# Patient Record
Sex: FEMALE | Race: WHITE | Employment: FULL TIME | URBAN - METROPOLITAN AREA
[De-identification: names, ages, dates, MRNs, and addresses within clinical notes are randomized per-mention and may not be internally consistent; named-entity substitution may affect disease eponyms.]

---

## 2017-01-23 PROBLEM — E89.41 SURGICAL MENOPAUSE, SYMPTOMATIC: Status: ACTIVE | Noted: 2017-01-23

## 2017-03-20 DIAGNOSIS — E89.41 SURGICAL MENOPAUSE, SYMPTOMATIC: ICD-10-CM

## 2017-03-20 RX ORDER — ESTRADIOL 0.03 MG/D
1 PATCH TRANSDERMAL WEEKLY
Qty: 4 PATCH | Refills: 0 | Status: CANCELLED | OUTPATIENT
Start: 2017-03-20

## 2017-04-05 ENCOUNTER — TELEPHONE (OUTPATIENT)
Dept: FAMILY MEDICINE | Facility: CLINIC | Age: 52
End: 2017-04-05

## 2017-04-05 NOTE — TELEPHONE ENCOUNTER
Panel Management Review      Patient has the fo    Composite cancer screening  Chart review shows that this patient is due/due soon for the following Mammogram  Summary:    Patient is due/failing the following:   MAMMOGRAM    Action needed:   Patient needs referral/order: mammogram    Type of outreach:    Sent letter.    Questions for provider review:    None                                                                                                                                    DAIN VALLEJO      .

## 2017-04-05 NOTE — LETTER
[facility]  [address] [address]  [phone]        April 5, 2017    Elaina Owens  [address] [place] [address]          Dear Elaina,    --Mammogram screening is generally recommended every year starting at age of 50.  Some women may choose to be screened at an earlier age ( between 40 & 50) depending on risk factors and discussions with her primary provider.   You can call [phone] to schedule a mammogram.    If you have had this test at an outside facility, please let us know so that we can update your record.     Please disregard this notice if you have already scheduled an appointment.     Sincerely,    [facility] - [phone]  [email or web address]

## 2017-08-24 ENCOUNTER — TELEPHONE (OUTPATIENT)
Dept: OBGYN | Facility: CLINIC | Age: 52
End: 2017-08-24

## 2017-08-24 NOTE — TELEPHONE ENCOUNTER
Panel Management Review          Composite cancer screening  Chart review shows that this patient is due/due soon for the following Mammogram  Summary:    Patient is due/failing the following:   MAMMOGRAM    Action needed:   Patient needs office visit for Mammogram.    Type of outreach:    Sent [employer] message.    Questions for provider review:    None                                                                                                                                    Susana Spears CMA

## 2017-08-24 NOTE — LETTER
August 24, 2017      Ealina Owens  [address] [place] [address]    Dear ,      This letter is to remind you that you are due for your follow up Mammogram.    Please call [phone] to schedule your appointment at your earliest convenience.     If you have completed the tests outside of [facility], please have the results forwarded to our office. We will update the chart for your primary Physician to review before your next annual physical.     Sincerely,      Yessenia Lake MD

## 2017-11-20 ENCOUNTER — TELEPHONE (OUTPATIENT)
Dept: FAMILY MEDICINE | Facility: CLINIC | Age: 52
End: 2017-11-20

## 2018-07-25 ENCOUNTER — OFFICE VISIT (OUTPATIENT)
Dept: FAMILY MEDICINE | Facility: CLINIC | Age: 53
End: 2018-07-25
Payer: COMMERCIAL

## 2018-07-25 VITALS
RESPIRATION RATE: 16 BRPM | TEMPERATURE: 98.5 F | SYSTOLIC BLOOD PRESSURE: 108 MMHG | BODY MASS INDEX: 29.18 KG/M2 | HEIGHT: 66 IN | WEIGHT: 181.6 LBS | OXYGEN SATURATION: 90 % | HEART RATE: 100 BPM | DIASTOLIC BLOOD PRESSURE: 84 MMHG

## 2018-07-25 DIAGNOSIS — J20.9 ACUTE BRONCHITIS, UNSPECIFIED ORGANISM: Primary | ICD-10-CM

## 2018-07-25 PROCEDURE — 99213 OFFICE O/P EST LOW 20 MIN: CPT | Performed by: NURSE PRACTITIONER

## 2018-07-25 RX ORDER — ALBUTEROL SULFATE 90 UG/1
2 AEROSOL, METERED RESPIRATORY (INHALATION) EVERY 4 HOURS PRN
Qty: 1 INHALER | Refills: 0 | Status: SHIPPED | OUTPATIENT
Start: 2018-07-25 | End: 2019-08-05

## 2018-07-25 RX ORDER — PREDNISONE 20 MG/1
40 TABLET ORAL DAILY
Qty: 10 TABLET | Refills: 0 | Status: SHIPPED | OUTPATIENT
Start: 2018-07-25 | End: 2018-07-30

## 2018-07-25 RX ORDER — AZITHROMYCIN 250 MG/1
TABLET, FILM COATED ORAL
Qty: 6 TABLET | Refills: 0 | Status: SHIPPED | OUTPATIENT
Start: 2018-07-25 | End: 2019-08-05

## 2018-07-25 NOTE — PATIENT INSTRUCTIONS

## 2018-07-25 NOTE — LETTER
[facility]  [address] [address]  Phone: [phone]    07/25/18    Elaina Owens  [address] [place] [address]      To whom it may concern:     Elaina was seen today in clinic for an acute illness. Please excuse her missed work. She may return to work when she has been fever free for 24 hours. She has been instructed to avoid work while she is having a fever.    Sincerely,      YAKOV Cast CNP

## 2018-07-25 NOTE — MR AVS SNAPSHOT
After Visit Summary   7/25/2018    Elaina Owens    MRN: [id number]           Patient Information     Date Of Birth          1965        Visit Information        Provider Department      7/25/2018 9:40 AM Nina Mckeon APRN [facility]        Today's Diagnoses     Acute bronchitis, unspecified organism    -  1      Care Instructions      Bronchitis, Antibiotic Treatment (Adult)    Bronchitis is an infection of the air passages (bronchial tubes) in your lungs. It often occurs when you have a cold. This illness is contagious during the first few days and is spread through the air by coughing and sneezing, or by direct contact (touching the sick person and then touching your own eyes, nose, or mouth).  Symptoms of bronchitis include cough with mucus (phlegm) and low-grade fever. Bronchitis usually lasts 7 to 14 days. Mild cases can be treated with simple home remedies. More severe infection is treated with an antibiotic.  Home care  Follow these guidelines when caring for yourself at home:    If your symptoms are severe, rest at home for the first 2 to 3 days. When you go back to your usual activities, don't let yourself get too tired.    Do not smoke. Also avoid being exposed to secondhand smoke.    You may use over-the-counter medicines to control fever or pain, unless another medicine was prescribed. If you have chronic liver or kidney disease or have ever had a stomach ulcer or gastrointestinal bleeding, talk with your healthcare provider before using these medicines. Also talk to your provider if you are taking medicine to prevent blood clots. Aspirin should never be given to anyone younger than 18 years of age who is ill with a viral infection or fever. It may cause severe liver or brain damage.    Your appetite may be poor, so a light diet is fine. Avoid dehydration by drinking 6 to 8 glasses of fluids per day (such as water, soft drinks, sports drinks, juices, tea, or  soup). Extra fluids will help loosen secretions in the nose and lungs.    Over-the-counter cough, cold, and sore-throat medicines will not shorten the length of the illness, but they may be helpful to reduce symptoms. (Note: Do not use decongestants if you have high blood pressure.)    Finish all antibiotic medicine. Do this even if you are feeling better after only a few days.  Follow-up care  Follow up with your healthcare provider, or as advised. If you had an X-ray or ECG (electrocardiogram), a specialist will review it. You will be notified of any new findings that may affect your care.  If you are age 65 or older, or if you have a chronic lung disease or condition that affects your immune system, or you smoke, ask your healthcare provider about getting a pneumococcal vaccine and a yearly flu shot (influenza vaccine).  When to seek medical advice  Call your healthcare provider right away if any of these occur:    Fever of 100.4 F (38 C) or higher, or as directed by your healthcare provider    Coughing up increased amounts of colored sputum    Weakness, drowsiness, headache, facial pain, ear pain, or a stiff neck  Call 911  Call 911 if any of these occur.    Coughing up blood    Worsening weakness, drowsiness, headache, or stiff neck    Trouble breathing, wheezing, or pain with breathing  Date Last Reviewed: 9/13/2015 2000-2017 [employer]. [address] [place] [address]. All rights reserved. This information is not intended as a substitute for professional medical care. Always follow your healthcare professional's instructions.                Follow-ups after your visit        Who to contact     If you have questions or need follow up information about today's clinic visit or your schedule please contact [facility] directly at [phone].  Normal or non-critical lab and imaging results will be communicated to you by MyChart, letter or phone within 4 business days  "after the clinic has received the results. If you do not hear from us within 7 days, please contact the clinic through [employer] or phone. If you have a critical or abnormal lab result, we will notify you by phone as soon as possible.  Submit refill requests through [employer] or call your pharmacy and they will forward the refill request to us. Please allow 3 business days for your refill to be completed.          Additional Information About Your Visit        [employer] Information     [employer] gives you secure access to your electronic health record. If you see a primary care provider, you can also send messages to your care team and make appointments. If you have questions, please call your primary care clinic.  If you do not have a primary care provider, please call [phone] and they will assist you.        Care EveryWhere ID     This is your Care EveryWhere ID. This could be used by other organizations to access your [place] medical records  [id number]        Your Vitals Were     Pulse Temperature Respirations Height Last Period Pulse Oximetry    100 98.5  F (36.9  C) (Tympanic) 16 5' 5.5\" (1.664 m) 11/25/2015 (Approximate) 90%    BMI (Body Mass Index)                   29.76 kg/m2            Blood Pressure from Last 3 Encounters:   07/25/18 108/84   11/29/16 119/83   10/19/16 101/69    Weight from Last 3 Encounters:   07/25/18 181 lb 9.6 oz (82.4 kg)   11/29/16 183 lb 3.2 oz (83.1 kg)   10/19/16 180 lb (81.6 kg)              Today, you had the following     No orders found for display         Today's Medication Changes          These changes are accurate as of 7/25/18  9:57 AM.  If you have any questions, ask your nurse or doctor.               Start taking these medicines.        Dose/Directions    azithromycin 250 MG tablet   Commonly known as:  ZITHROMAX   Used for:  Acute bronchitis, unspecified organism   Started by:  Nina Mckeon APRN CNP        Two tablets first day, then one tablet daily for " four days.   Quantity:  6 tablet   Refills:  0       predniSONE 20 MG tablet   Commonly known as:  DELTASONE   Used for:  Acute bronchitis, unspecified organism   Started by:  Nina Mckeon APRN CNP        Dose:  40 mg   Take 2 tablets (40 mg) by mouth daily for 5 days   Quantity:  10 tablet   Refills:  0            Where to get your medicines      These medications were sent to [facility] [place], [place] - [address]  [address] [address]     Phone:  [phone]     azithromycin 250 MG tablet    predniSONE 20 MG tablet                Primary Care Provider Office Phone # Fax #    Jeri Benoit YAKOV Yin [phone]       [address] [address]        Equal Access to Services     ILEANA BUNCH : Shakila lingo Somarycarmen, waaxda luqadaha, qaybta kaalmada adeegyada, cathleen young . [facility] [phone].    ATENCIÓN: Si habla español, tiene a rhoades disposición servicios gratuitos de asistencia lingüística. Llame al [phone].    We comply with applicable federal civil rights laws and [place] laws. We do not discriminate on the basis of race, color, national origin, age, disability, sex, sexual orientation, or gender identity.            Thank you!     Thank you for choosing [facility]  for your care. Our goal is always to provide you with excellent care. Hearing back from our patients is one way we can continue to improve our services. Please take a few minutes to complete the written survey that you may receive in the mail after your visit with us. Thank you!             Your Updated Medication List - Protect others around you: Learn how to safely use, store and throw away your medicines at www.disposemymeds.org.          This list is accurate as of 7/25/18  9:57 AM.  Always use your most recent med list.                   Brand Name Dispense Instructions for use Diagnosis     azithromycin 250 MG tablet    ZITHROMAX    6 tablet    Two tablets first day, then one tablet daily for four days.    Acute bronchitis, unspecified organism       BLACK COHOSH PO           estradiol 0.025 MG/24HR Ptwk WK patch    FEMPATCH    12 patch    Place 1 patch onto the skin once a week    Surgical menopause, symptomatic       HYDROcodone-acetaminophen 5-325 MG per tablet    NORCO    45 tablet    Take 1-2 tablets by mouth every 4 hours as needed for other (Moderate to Severe Pain)    Intramural leiomyoma of uterus       lidocaine 5 % Patch    LIDODERM    30 patch    Apply up to 3 patches to painful area at once for up to 12 h within a 24 h period.  Remove after 12 hours.    Sciatica of right side, S/P hysterectomy with oophorectomy       MULTI-VITAMIN PO           OMEGA-3 FISH OIL PO           omeprazole 40 MG capsule    priLOSEC    90 capsule    Take 1 capsule (40 mg) by mouth daily Take 30-60 minutes before a meal.    Epigastric pain       predniSONE 20 MG tablet    DELTASONE    10 tablet    Take 2 tablets (40 mg) by mouth daily for 5 days    Acute bronchitis, unspecified organism       VITAMIN C PO

## 2018-07-25 NOTE — PROGRESS NOTES
SUBJECTIVE:   Elaina Owens is a 52 year old female who presents to clinic today for the following health issues:      ENT Symptoms             Symptoms: cc Present Absent Comment   Fever/Chills  x  101 last night, chills and sweats   Fatigue   x    Muscle Aches   x    Eye Irritation   x    Sneezing   x    Nasal Tae/Drg   x    Sinus Pressure/Pain   x    Loss of smell   x    Dental pain   x    Sore Throat  x  From cough   Swollen Glands   x    Ear Pain/Fullness   x    Cough x x  Productive of light yellow sputum, dark green this morning, worse a couple days ago   Wheeze  x     Chest Pain   x    Shortness of breath  x     Rash   x    Other  x  diarrhea     Symptom duration:  4 days   Symptom severity:  moderate to severe   Treatments tried:  mucinex, no solid foods x 4 days, cough drops   Contacts:  none       Problem list and histories reviewed & adjusted, as indicated.  Additional history: as documented    Patient Active Problem List   Diagnosis     Tobacco use disorder     Gastroesophageal reflux disease without esophagitis     Bloating     Tubular adenoma     Surgical menopause, symptomatic     Past Surgical History:   Procedure Laterality Date      SECTION  x 2     LAPAROSCOPIC HYSTERECTOMY TOTAL, BILATERAL SALPINGO-OOPHORECTOMY, COMBINED N/A 10/19/2016    TLH, BSO, cysto       Social History   Substance Use Topics     Smoking status: Current Every Day Smoker     Smokeless tobacco: Never Used      Comment: 5 cigarettes per day     Alcohol use 0.0 oz/week     0 Standard drinks or equivalent per week      Comment: 10-15 drinks per week     Family History   Problem Relation Age of Onset     Coronary Artery Disease Mother 65     quadruple bypass     Hyperlipidemia Father      Diabetes Father      Macular Degeneration Father      Brain Cancer Maternal Grandmother      Stomach Cancer Paternal Grandmother      Hypertension Brother            Reviewed and updated as needed this visit by clinical staff      "  Reviewed and updated as needed this visit by Provider         ROS:  Constitutional, HEENT, cardiovascular, pulmonary, gi and gu systems are negative, except as otherwise noted.    OBJECTIVE:     /84 (BP Location: Left arm, Patient Position: Sitting, Cuff Size: Adult Large)  Pulse 100  Temp 98.5  F (36.9  C) (Tympanic)  Resp 16  Ht 5' 5.5\" (1.664 m)  Wt 181 lb 9.6 oz (82.4 kg)  LMP 11/25/2015 (Approximate)  SpO2 90%  BMI 29.76 kg/m2  Body mass index is 29.76 kg/(m^2).  GENERAL: healthy, alert and no distress  EYES: Eyes grossly normal to inspection, PERRL and conjunctivae and sclerae normal  HENT: ear canals and TM's normal, nose and mouth without ulcers or lesions  NECK: no adenopathy, no asymmetry, masses, or scars and thyroid normal to palpation  RESP: rhonchi bilateral and expiratory wheezes bilateral  CV: regular rate and rhythm, normal S1 S2, no S3 or S4, no murmur, click or rub, no peripheral edema and peripheral pulses strong  MS: no gross musculoskeletal defects noted, no edema  SKIN: no suspicious lesions or rashes  NEURO: Normal strength and tone, mentation intact and speech normal    Diagnostic Test Results:  none     ASSESSMENT/PLAN:       ICD-10-CM    1. Acute bronchitis, unspecified organism J20.9 azithromycin (ZITHROMAX) 250 MG tablet     predniSONE (DELTASONE) 20 MG tablet     albuterol (PROAIR HFA/PROVENTIL HFA/VENTOLIN HFA) 108 (90 Base) MCG/ACT Inhaler       FUTURE APPOINTMENTS:       - Follow up in 3 days for symptoms that are not improving, sooner for new or worsening sx.     Patient Instructions     Bronchitis, Antibiotic Treatment (Adult)    Bronchitis is an infection of the air passages (bronchial tubes) in your lungs. It often occurs when you have a cold. This illness is contagious during the first few days and is spread through the air by coughing and sneezing, or by direct contact (touching the sick person and then touching your own eyes, nose, or mouth).  Symptoms of " bronchitis include cough with mucus (phlegm) and low-grade fever. Bronchitis usually lasts 7 to 14 days. Mild cases can be treated with simple home remedies. More severe infection is treated with an antibiotic.  Home care  Follow these guidelines when caring for yourself at home:    If your symptoms are severe, rest at home for the first 2 to 3 days. When you go back to your usual activities, don't let yourself get too tired.    Do not smoke. Also avoid being exposed to secondhand smoke.    You may use over-the-counter medicines to control fever or pain, unless another medicine was prescribed. If you have chronic liver or kidney disease or have ever had a stomach ulcer or gastrointestinal bleeding, talk with your healthcare provider before using these medicines. Also talk to your provider if you are taking medicine to prevent blood clots. Aspirin should never be given to anyone younger than 18 years of age who is ill with a viral infection or fever. It may cause severe liver or brain damage.    Your appetite may be poor, so a light diet is fine. Avoid dehydration by drinking 6 to 8 glasses of fluids per day (such as water, soft drinks, sports drinks, juices, tea, or soup). Extra fluids will help loosen secretions in the nose and lungs.    Over-the-counter cough, cold, and sore-throat medicines will not shorten the length of the illness, but they may be helpful to reduce symptoms. (Note: Do not use decongestants if you have high blood pressure.)    Finish all antibiotic medicine. Do this even if you are feeling better after only a few days.  Follow-up care  Follow up with your healthcare provider, or as advised. If you had an X-ray or ECG (electrocardiogram), a specialist will review it. You will be notified of any new findings that may affect your care.  If you are age 65 or older, or if you have a chronic lung disease or condition that affects your immune system, or you smoke, ask your healthcare provider about  getting a pneumococcal vaccine and a yearly flu shot (influenza vaccine).  When to seek medical advice  Call your healthcare provider right away if any of these occur:    Fever of 100.4 F (38 C) or higher, or as directed by your healthcare provider    Coughing up increased amounts of colored sputum    Weakness, drowsiness, headache, facial pain, ear pain, or a stiff neck  Call 911  Call 911 if any of these occur.    Coughing up blood    Worsening weakness, drowsiness, headache, or stiff neck    Trouble breathing, wheezing, or pain with breathing  Date Last Reviewed: 9/13/2015 2000-2017 [employer]. [address] [place] [address]. All rights reserved. This information is not intended as a substitute for professional medical care. Always follow your healthcare professional's instructions.            YAKOV Cast [facility]

## 2018-07-31 ENCOUNTER — HEALTH MAINTENANCE LETTER (OUTPATIENT)
Age: 53
End: 2018-07-31

## 2018-09-20 ENCOUNTER — TELEPHONE (OUTPATIENT)
Dept: FAMILY MEDICINE | Facility: CLINIC | Age: 53
End: 2018-09-20

## 2018-09-20 NOTE — LETTER
[facility]  [address] [address]  [phone]    September 20, 2018    Elaina Owens  [address]  [place] [address]          Dear Elaina,    --Mammogram screening is generally recommended every year starting at age of 50.  Some women may choose to be screened at an earlier age ( between 40 & 50) depending on risk factors and discussions with her primary provider.       Please call [phone] to schedule a mammogram.      Please disregard this notice if you have already scheduled an appointment.     Sincerely,    Jeri GREENE  [facility] - [phone]  [email or web address]

## 2018-09-20 NOTE — TELEPHONE ENCOUNTER
Panel Management Review          Composite cancer screening  Chart review shows that this patient is due/due soon for the following Mammogram  Summary:    Patient is due/failing the following:   MAMMOGRAM    Action needed:   Patient needs to schedule a mammogram    Type of outreach:    Sent letter.    Questions for provider review:    None                                                                                                                                    DANI VALLEJO

## 2019-03-18 ENCOUNTER — TELEPHONE (OUTPATIENT)
Dept: FAMILY MEDICINE | Facility: CLINIC | Age: 54
End: 2019-03-18

## 2019-03-18 NOTE — LETTER
March 18, 2019      Elaina Owens  [address] [place] [address]        Dear Elaina Owens, [phone]    At [facility] we care about your health and are committed to providing quality patient care, which includes staying current on preventative cancer screenings.  You can increase your chances of finding and treating cancers through regular screenings.      Our records show that you are due for the following screening(s):    Mammogram for breast cancer - [phone] to schedule  Recommended every 1-2 years for women age 50 and older  Mammograms help detect breast cancer, which is the most common cancer among women in the [place].  You may need to start having mammograms earlier and more often if you have had breast cancer, breast problems, or a family history of breast cancer.     Pap Smear for cervical cancer - [phone] to schedule-- Due in July  Recommended every three years for women 21 and older  A Pap test is used to detect cervical cancer.  The test should be taken at least once every three years but women who are at a greater risk for cervical cancer may need to have the test more often.    You are at a greater risk for cervical cancer if:   - You have had a sexually transmitted disease   - You have had more than one sex partner   - You have had an abnormal pap test in the past    If you have a My-Chart Account, you also can schedule this appointment through there.    If you have already had one or all of the above screening tests at another facility, please call us so that we may update your chart.        Your partners in health,      Quality Committee   [facility]

## 2019-03-18 NOTE — TELEPHONE ENCOUNTER
Panel Management Review          Composite cancer screening  Chart review shows that this patient is due/due soon for the following Mammogram  Pap smear due in July  Summary:    Patient is due/failing the following:   MAMMOGRAM    Action needed:   Patient needs to schedule mammogram  Schedule physical in July    Type of outreach:    Sent letter.    Questions for provider review:    None                                                                                                                                 DANI VALLEJO

## 2019-06-21 ENCOUNTER — TELEPHONE (OUTPATIENT)
Dept: SCHEDULING | Facility: CLINIC | Age: 54
End: 2019-06-21

## 2019-06-21 NOTE — TELEPHONE ENCOUNTER
Per outreach, patient is aware of overdue screening and will call on their own time for scheduling.     Screening: Preventive Health Screening Mammogram    Thanks\

## 2019-08-05 ENCOUNTER — OFFICE VISIT (OUTPATIENT)
Dept: FAMILY MEDICINE | Facility: CLINIC | Age: 54
End: 2019-08-05
Payer: COMMERCIAL

## 2019-08-05 VITALS
WEIGHT: 180 LBS | TEMPERATURE: 98.6 F | SYSTOLIC BLOOD PRESSURE: 118 MMHG | HEIGHT: 65 IN | BODY MASS INDEX: 29.99 KG/M2 | HEART RATE: 99 BPM | OXYGEN SATURATION: 97 % | DIASTOLIC BLOOD PRESSURE: 86 MMHG

## 2019-08-05 DIAGNOSIS — M77.11 LATERAL EPICONDYLITIS OF BOTH ELBOWS: Primary | ICD-10-CM

## 2019-08-05 DIAGNOSIS — M77.12 LATERAL EPICONDYLITIS OF BOTH ELBOWS: Primary | ICD-10-CM

## 2019-08-05 DIAGNOSIS — Z12.31 VISIT FOR SCREENING MAMMOGRAM: ICD-10-CM

## 2019-08-05 PROCEDURE — 99213 OFFICE O/P EST LOW 20 MIN: CPT | Performed by: NURSE PRACTITIONER

## 2019-08-05 ASSESSMENT — MIFFLIN-ST. JEOR: SCORE: 1422.35

## 2019-08-05 NOTE — PATIENT INSTRUCTIONS
Ice for 15 minutes several times daily.   NSAID (ibuprofen 600 mg every 6 hours or aleve 2 tabs twice daily) for pain and inflammation.  Elbow strap during the day  Avoid heavy lifting.  Schedule occupational therapy.           You are due for a screening Mammogram.  Please contact the Diagnostics Registration Department at: [phone] to schedule this appointment.    Your clinic record indicates that you are due for:   Pap and physical exam  If fasting labs are indicated, call before coming in to let the  know when you are coming in.  Need to be fasting for 10 hrs so just coming in before eating breakfast is the easiest.       Thank you for choosing [facility].  You may be receiving an email and/or telephone survey request from [facility] Customer Experience regarding your visit today.  Please take a few minutes to respond to the survey to let us know how we are doing.      If you have questions or concerns, please contact us via [employer] or you can contact your care team at [phone].    Our Clinic hours are:  Monday 6:40 am  to 7:00 pm  Tuesday -Friday 6:40 am to 5:00 pm    The [place] outpatient lab hours are:  Monday - Friday 6:10 am to 4:45 pm  Saturdays 7:00 am to 11:00 am  Appointments are required, call [phone]    If you have clinical questions after hours or would like to schedule an appointment,  call the clinic at [phone].

## 2019-08-05 NOTE — PROGRESS NOTES
"Subjective     Elaina Owens is a 54 year old female who presents to clinic today for the following health issues:    HPI   Musculoskeletal problem/pain      Duration: 4 months    Description  Location: bilateral elbows, lateral.   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    Therapies tried and outcome: biofreeze- temporary relief              Reviewed and updated as needed this visit by Provider         Review of Systems   ROS COMP: Constitutional, HEENT, cardiovascular, pulmonary, gi and gu systems are negative, except as otherwise noted.      Objective    /86 (BP Location: Right arm)   Pulse 99   Temp 98.6  F (37  C) (Tympanic)   Ht 1.651 m (5' 5\")   Wt 81.6 kg (180 lb)   LMP 11/25/2015 (Approximate)   SpO2 97%   BMI 29.95 kg/m    Body mass index is 29.95 kg/m .  Physical Exam   GENERAL: healthy, alert and no distress  MS: elbow exam: normal appearance, range of motion full and tender at the lateral epicondyle bilaterally. Left hand  strength weaker than the right.          Assessment & Plan       ICD-10-CM    1. Lateral epicondylitis of both elbows M77.11 OCCUPATIONAL THERAPY REFERRAL    M77.12    2. Visit for screening mammogram Z12.31 MA SCREENING DIGITAL BILAT - Future  (s+30)          Patient Instructions     Ice for 15 minutes several times daily.   NSAID (ibuprofen 600 mg every 6 hours or aleve 2 tabs twice daily) for pain and inflammation.  Elbow strap during the day  Avoid heavy lifting.  Schedule occupational therapy.           You are due for a screening Mammogram.  Please contact the Diagnostics Registration Department at: " [phone] to schedule this appointment.    Your clinic record indicates that you are due for:   Pap and physical exam  If fasting labs are indicated, call before coming in to let the  know when you are coming in.  Need to be fasting for 10 hrs so just coming in before eating breakfast is the easiest.       Thank you for choosing [facility].  You may be receiving an email and/or telephone survey request from [facility] Customer Experience regarding your visit today.  Please take a few minutes to respond to the survey to let us know how we are doing.      If you have questions or concerns, please contact us via [employer] or you can contact your care team at [phone].    Our Clinic hours are:  Monday 6:40 am  to 7:00 pm  Tuesday -Friday 6:40 am to 5:00 pm    The [place] outpatient lab hours are:  Monday - Friday 6:10 am to 4:45 pm  Saturdays 7:00 am to 11:00 am  Appointments are required, call [phone]    If you have clinical questions after hours or would like to schedule an appointment,  call the clinic at [phone].        Return in about 4 weeks (around 9/2/2019), or if symptoms worsen or fail to improve.    YAKOV Heck CNP  [facility] [place]

## 2019-08-14 ENCOUNTER — HOSPITAL ENCOUNTER (OUTPATIENT)
Dept: OCCUPATIONAL THERAPY | Facility: CLINIC | Age: 54
Setting detail: THERAPIES SERIES
End: 2019-08-14
Attending: NURSE PRACTITIONER
Payer: COMMERCIAL

## 2019-08-14 DIAGNOSIS — M77.12 LATERAL EPICONDYLITIS OF BOTH ELBOWS: ICD-10-CM

## 2019-08-14 DIAGNOSIS — M77.11 LATERAL EPICONDYLITIS OF BOTH ELBOWS: ICD-10-CM

## 2019-08-14 PROCEDURE — 97165 OT EVAL LOW COMPLEX 30 MIN: CPT | Mod: GO | Performed by: OCCUPATIONAL THERAPIST

## 2019-08-14 PROCEDURE — 97535 SELF CARE MNGMENT TRAINING: CPT | Mod: GO | Performed by: OCCUPATIONAL THERAPIST

## 2019-08-14 PROCEDURE — 97140 MANUAL THERAPY 1/> REGIONS: CPT | Mod: GO | Performed by: OCCUPATIONAL THERAPIST

## 2019-08-14 PROCEDURE — 97035 APP MDLTY 1+ULTRASOUND EA 15: CPT | Mod: GO | Performed by: OCCUPATIONAL THERAPIST

## 2019-08-14 PROCEDURE — 97110 THERAPEUTIC EXERCISES: CPT | Mod: GO | Performed by: OCCUPATIONAL THERAPIST

## 2019-08-14 NOTE — PROGRESS NOTES
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   Previous treatment or current condition Biofreeze, Ibuprofen and Aircast.   Past medical history see chart   How/Where did it occur With repetition/overuse;At work   Onset date of current episode/exacerbation 04/14/19  (approx)   Chronicity New   Hand Dominance Right   Affected side Bilateral  (left greater than right)   Functional limitations perform activities of daily living;perform required work activities;perform desired leisure / sports activities   Reported Symptoms Pain;Numbness;Tingling;Edema   Prior level of function Independent ADL;Independent IADL   Important Activities yard work, gardening, stack wood   Level of functions comments not doing right now due to pain.   Patient role/Employment history Employed   Occupation  at a group [employer]   Occupation Comments able to do less direct care now with more work.   Patient/Family goals statement Patient relates she would like to be able to be able to lift and reach  things with out pain like reaching her coffee while driving and or squeezing out a dish cloth.   Fall Risk Screen   Fall screen completed by OT   Have you fallen 2 or more times in the past year? No   Have you fallen and had an injury in the past year? No   Is patient a fall risk? No   Abuse Screen (yes response referral indicated)   Feels Unsafe at Home or Work/School no   Feels Threatened by Someone no   Does Anyone Try to Keep You From Having Contact with Others or Doing Things Outside Your Home?  no   Physical Signs of Abuse Present no   Pain   Pain Primary Pain Report   Primary Pain Report   Location right lateral elbow and left extensor wad   Pain Quality Sharp;Shooting;Burning;Throbbing   Frequency Intermittent   Scale 2/10;9/10   Pain Is Worse During The Day   Pain Is Exacerbated By Using Arm At Shoulder Level;Lifting;Carrying;Pinching;Pushing;Gripping;Twisting , Pulling;Activity/movement   Pain Is Relieved By Splints;Immobilization   Progression Since Onset Unchanged   Edema   Edema Elbow Crease   Elbow Crease (measured in cm)   Elbow Crease -  - Left 25   Elbow Crease -  - Right 25   Tenderness   Tenderness Lateral Elbow   Lateral Elbow   Left Moderate  (mod to severe)   Right Mild  (to moderate)   Palpation   Palpation Assessed   Left Hand Tenderness Present At: ECRL;ECRB Insertion;Lateral epicondyle   Left Hand  Palpation Comments more pain at extensor was    Right Hand Tenderness Present At: Lateral epicondyle;ECRL;ECRB Insertion   ROM   ROM AROM   AROM   Comments ETL - 30 bilateral - pain worse in left   Mobility Testing   Mobility Testing tightness with active radial nerve glide on left.   Sensation Findings   Sensation Findings Other (see comments)   Sensation Comments very infrequent numbness and tingling into finger tips with use , not formally tested today.   Strength   Strength Strength;Other (see comments)    Avg - Left 25 3/10    Avg - Right 38 //10 pain    Elbow Extended Avg - Left 10 5-7/10    Elbow Extended Avg - Right 30 3-4/10   Strength Comments increased pain with resistance to finger and wrist extensors bilaterally left greater than right. Middle finger extension very painful on right.   Education Assessment   Preferred Learning Style Listening;Demonstration   Barriers to Learning No barriers   Therapy Interventions   Planned Therapy Interventions Ultrasound;Iontophoresis (list drug);Light Therapy;Strengthening;ROM;Stretching;Manual Therapy;Splinting;Education of  splint wear, care, fit and precautions;Self Care/Home Management;Ergonomic Patient Education;Home Program   Therapy plan comments To be added as appropriate   Clinical Impression   Criteria for Skilled Therapeutic Interventions Met yes   OT Diagnosis Decreased ADL's IADL's   Influenced by the following impairments Pain;Decreased range of motion;Decreased strength;Impaired sensation   Assessment of Occupational Performance 5 or more Performance Deficits   Identified Performance Deficits reaching, gripping, carrying things, opening items. cleaning, yard work   Clinical Decision Making (Complexity) Low complexity   Therapy Frequency 2x/week   Predicted Duration of Therapy Intervention (days/wks) 6 weeks   Risks and Benefits of Treatment have been explained. Yes   Patient, Family & other staff in agreement with plan of care Yes   Clinical Impression Comments Patient presents with above physical and functional deficits due to bilateral Lateral Epicondylitis as diagnosed left greater than right with irritation at PIN on left suggesting possible Radial Tunnel Syndrome as well. Anticipate patient will benefit from skilled Hand Therapy to meet functional goals.   Hand Goals   Hand Goals Work;Driving   Work   Current Functional Task Reaching   Previous Performance Level Independent   Current Performance Level Severe difficulty   Goal Target Task Reach forward inches  (to get things off top shelf)   Goal Target Performance Level Mild difficulty   Due Date 09/27/19   If goal not met, Why? STG: Mod by 9/15/19   Driving   Current Functional Task   (holding coffee)   Previous Performance Level Independent   Current Performance Level Severe difficulty   Goal Target Task   (reach coffee cup in car)   Goal Target Performance Level Mild difficulty   Due Date 09/27/19   If goal not met, Why? STG: Mod by 9/15/19   Total Evaluation Time   OT Eval, Low Complexity Minutes (38260) 25   JACINTO Medrano/L CHT  Occupational Therapist,  Certified Hand Therapist

## 2019-08-19 ENCOUNTER — HOSPITAL ENCOUNTER (OUTPATIENT)
Dept: OCCUPATIONAL THERAPY | Facility: CLINIC | Age: 54
Setting detail: THERAPIES SERIES
End: 2019-08-19
Attending: NURSE PRACTITIONER
Payer: COMMERCIAL

## 2019-08-19 PROCEDURE — 97110 THERAPEUTIC EXERCISES: CPT | Mod: GO | Performed by: OCCUPATIONAL THERAPIST

## 2019-08-19 PROCEDURE — 97035 APP MDLTY 1+ULTRASOUND EA 15: CPT | Mod: GO | Performed by: OCCUPATIONAL THERAPIST

## 2019-08-19 PROCEDURE — 97140 MANUAL THERAPY 1/> REGIONS: CPT | Mod: GO | Performed by: OCCUPATIONAL THERAPIST

## 2019-08-23 ENCOUNTER — HOSPITAL ENCOUNTER (OUTPATIENT)
Dept: OCCUPATIONAL THERAPY | Facility: CLINIC | Age: 54
Setting detail: THERAPIES SERIES
End: 2019-08-23
Attending: NURSE PRACTITIONER
Payer: COMMERCIAL

## 2019-08-23 PROCEDURE — 97140 MANUAL THERAPY 1/> REGIONS: CPT | Mod: GO | Performed by: OCCUPATIONAL THERAPIST

## 2019-08-23 PROCEDURE — 97035 APP MDLTY 1+ULTRASOUND EA 15: CPT | Mod: GO | Performed by: OCCUPATIONAL THERAPIST

## 2019-09-04 ENCOUNTER — HOSPITAL ENCOUNTER (OUTPATIENT)
Dept: OCCUPATIONAL THERAPY | Facility: CLINIC | Age: 54
Setting detail: THERAPIES SERIES
End: 2019-09-04
Attending: NURSE PRACTITIONER
Payer: COMMERCIAL

## 2019-09-04 PROCEDURE — 97035 APP MDLTY 1+ULTRASOUND EA 15: CPT | Mod: GO | Performed by: OCCUPATIONAL THERAPIST

## 2019-09-04 PROCEDURE — 97140 MANUAL THERAPY 1/> REGIONS: CPT | Mod: GO | Performed by: OCCUPATIONAL THERAPIST

## 2019-09-04 PROCEDURE — 97026 INFRARED THERAPY: CPT | Mod: GO | Performed by: OCCUPATIONAL THERAPIST

## 2019-09-06 ENCOUNTER — HOSPITAL ENCOUNTER (OUTPATIENT)
Dept: OCCUPATIONAL THERAPY | Facility: CLINIC | Age: 54
Setting detail: THERAPIES SERIES
End: 2019-09-06
Attending: NURSE PRACTITIONER
Payer: COMMERCIAL

## 2019-09-06 PROCEDURE — 97140 MANUAL THERAPY 1/> REGIONS: CPT | Mod: GO | Performed by: OCCUPATIONAL THERAPIST

## 2019-09-06 PROCEDURE — 97026 INFRARED THERAPY: CPT | Mod: GO | Performed by: OCCUPATIONAL THERAPIST

## 2019-09-06 PROCEDURE — 97035 APP MDLTY 1+ULTRASOUND EA 15: CPT | Mod: GO | Performed by: OCCUPATIONAL THERAPIST

## 2019-10-10 NOTE — PROGRESS NOTES
09/06/19   Note: This is a copy of patient's last daily visit and will also serve as their Discharge Summary as they have not been in for further treatment 30 days past this date. Final assessment of goals and physical and functional status , therefore unavailable.  Note- Patient did send note saying that she could not get back in due to scheduling conflicts at work.   Providers   Providers Julia Escalante OTR/L, CHT   Referring Physician Jeri Lane CNP   Reporting Period   Reporting period from 08/14/19   Reporting period to 09/06/19   General Information   Rxs Used 5   Medical Diagnosis Bilateral Lateral Epicondylitis   Orders Evaluate And Treat As Indicated   Insurance [employer]   Start Of Care Date 08/14/19   Onset date of current episode/exacerbation 04/14/19  (approx)   Subjective Measures   Subjective Client fell yesterday and had to lift him up. Is very sore today.   Initial Pain level 10/10  (at worst 2 at best)   Current Pain level 4/10  (on left right)   Objective Measures   Objective Measures Objective Measure 1   Objective Measure 1   Objective Measure palpation to lateral elbows   Details mod to severe- worse on left   Modalities   Modalities Ultrasound;Infrared Laser Light Therapy   Ultrasound -Type Pulsed 50%;5 cm sound head   Ultrasound, Minutes (04309) 8 Minutes   Skilled Interventions To Increase Blood Flow For Improved Healing;To Enhance Tissue Repair   Intensity 1.0w/cm2   Frequency 3 MHz   Location  left lateral elbows 5 min each   Positioning sitting   Infrared Laser Light Therapy 30 sec   Infrared Treatment Minutes (62517) 2 Minutes   Location bilateral lateral elbows   Laser light position Sitting   Skilled Interventions To Enhance Tissue Repair   Therapeutic Exercise   HAND Therapeutic Exercise Other Exercises/Activities   Other Exer/Activities/Educ   Exercise 1 extrinsic stretches   Description 1 x 5 with HEP instruction   Exercise 2 Artisan moving stretch   Description 2 x3  with  HEP instruction   Exercise 3 radial nerve glide   Description 3 HEP instruction   Exercise 4 cross over interlocking stretch   Description 4 bilateral instruciton   Exercise 5 eccentric wrist   Description 5 1.1# x 5 with HEP instruction   Skilled Interventions   (verbal and physical cuing needed)   Manual   Manual STM   Manual Therapy Minutes (84597) 15   Skilled Interventions To Increase Tissue Extensibility;To Increase Tissue Excursion   STM Extensors;Tool Assisted;Scalenes;Bicep;Tricep   Position Sitting   Description/ Technique Gu[place] Sha tools and tigerbalm   Hand Goals   Hand Goals Work;Driving   Work   Current Functional Task Reaching   Previous Performance Level Independent   Current Performance Level Severe difficulty   Goal Target Task Reach forward inches  (to get things off top shelf)   Goal Target Performance Level Mild difficulty   Due Date 09/27/19   If goal not met, Why? STG: Mod by 9/15/19   Driving   Current Functional Task   (holding coffee)   Previous Performance Level Independent   Current Performance Level Severe difficulty   Goal Target Task   (reach coffee cup in car)   Goal Target Performance Level Mild difficulty   Due Date 09/27/19   If goal not met, Why? STG: Mod by 9/15/19   Assessment   Clinical Impression(s) Comments right doing fairly well, symptoms on left exaserbaated due to recent incident as described above at work.   Response to Therapy: Improvements Flexibility;Pain;Self Care Skills   Education   Learner Patient   Readiness Eager   Method Booklet/handout;Explanation;Demonstration   Response Verbalizes understanding;Demonstrates understanding   Education Notes see home program   Plan   Home program stretching as aboeve, radial nerve glide , acitivitymodification and counterforce brace wear with activity only and if not painful., heat, massage instruction   Plan check eccentrics next visit.   Total Session Time   Julia Escalante OTR/L CHT  Occupational Therapist, Certified  Hand Therapist

## 2020-01-06 ENCOUNTER — TELEPHONE (OUTPATIENT)
Dept: FAMILY MEDICINE | Facility: CLINIC | Age: 55
End: 2020-01-06

## 2020-01-06 NOTE — TELEPHONE ENCOUNTER
Panel Management Review          Composite cancer screening  Chart review shows that this patient is due/due soon for the following Pap Smear and Mammogram  Summary:    Patient is due/failing the following:   MAMMOGRAM and PAP    Action needed:   Patient needs office visit for physical and pap smear  Schedule mammogram.    Type of outreach:    Sent letter.    Questions for provider review:    None                                                                                                                                    DANI VALLEJO

## 2020-01-06 NOTE — LETTER
January 6, 2020      Elaina Owens  [address] [place] [address]        Dear Elaina Owens, [id number]    At [facility] we care about your health and are committed to providing quality patient care, which includes staying current on preventative cancer screenings.  You can increase your chances of finding and treating cancers through regular screenings.      Our records show that you are due for the following screening(s):    Mammogram for breast cancer - [phone] to schedule  Recommended every 1-2 years for women age 50 and older  Mammograms help detect breast cancer, which is the most common cancer among women in the [place].  You may need to start having mammograms earlier and more often if you have had breast cancer, breast problems, or a family history of breast cancer.     Pap Smear for cervical cancer - [phone] to schedule  Recommended every three years for women 21 and older  A Pap test is used to detect cervical cancer.  The test should be taken at least once every three years but women who are at a greater risk for cervical cancer may need to have the test more often.      You are at a greater risk for cervical cancer if:   - You have had a sexually transmitted disease   - You have had more than one sex partner   - You have had an abnormal pap test in the past    If you have already had one or all of the above screening tests at another facility, please call us so that we may update your chart.      Your partners in health,      Quality Committee   [facility]

## 2020-03-10 ENCOUNTER — HEALTH MAINTENANCE LETTER (OUTPATIENT)
Age: 55
End: 2020-03-10

## 2020-06-18 ENCOUNTER — TELEPHONE (OUTPATIENT)
Dept: FAMILY MEDICINE | Facility: CLINIC | Age: 55
End: 2020-06-18

## 2020-06-18 NOTE — LETTER
June 18, 2020      Elaina Owens  [address] [place] [address]        Dear Elaina Owens, [id number]    At [facility] we care about your health and are committed to providing quality patient care, which includes staying current on preventative cancer screenings.  You can increase your chances of finding and treating cancers through regular screenings.      Our records show that you are due for the following screening(s):    Mammogram for breast cancer - [phone] to schedule  Recommended every 1-2 years for women age 50 and older  Mammograms help detect breast cancer, which is the most common cancer among women in the [place].  You may need to start having mammograms earlier and more often if you have had breast cancer, breast problems, or a family history of breast cancer.     Pap Smear for cervical cancer - [phone] to schedule-   We should be scheduling physical late July / August.  Recommended every three years for women 21 and older  A Pap test is used to detect cervical cancer.  The test should be taken at least once every three years but women who are at a greater risk for cervical cancer may need to have the test more often.    You are at a greater risk for cervical cancer if:   - You have had a sexually transmitted disease   - You have had more than one sex partner   - You have had an abnormal pap test in the past    If you have a My-Chart Account, you also can schedule this appointment through there.    If you have already had one or all of the above screening tests at another facility, please call us so that we may update your chart.      Your partners in health,      Quality Committee / [facility]

## 2020-12-27 ENCOUNTER — HEALTH MAINTENANCE LETTER (OUTPATIENT)
Age: 55
End: 2020-12-27

## 2021-04-24 ENCOUNTER — HEALTH MAINTENANCE LETTER (OUTPATIENT)
Age: 56
End: 2021-04-24

## 2021-10-09 ENCOUNTER — HEALTH MAINTENANCE LETTER (OUTPATIENT)
Age: 56
End: 2021-10-09

## 2021-10-26 NOTE — PROGRESS NOTES
SUBJECTIVE:   CC: Elaina Owens is an 56 year old woman who presents for preventive health visit.       Patient has been advised of split billing requirements and indicates understanding: Yes  Healthy Habits:     Getting at least 3 servings of Calcium per day:  Yes    Bi-annual eye exam:  Yes    Dental care twice a year:  NO    Sleep apnea or symptoms of sleep apnea:  None    Diet:  Regular (no restrictions)    Frequency of exercise:  2-3 days/week    Duration of exercise:  Less than 15 minutes    Taking medications regularly:  Yes    Medication side effects:  None    PHQ-2 Total Score: 2      Hip Pain  States that she has had left lateral hip pain for 3 months.  States no injury or incident.  Increased pain after a day of work, prolonged standing/walking.  Has been taking tylenol/IBU and stretching to little effect.  Better with rest.    Today's PHQ-2 Score:   PHQ-2 ( 1999 [employer]) 10/29/2021   Q1: Little interest or pleasure in doing things 1   Q2: Feeling down, depressed or hopeless 1   PHQ-2 Score 2   Q1: Little interest or pleasure in doing things Several days   Q2: Feeling down, depressed or hopeless Several days   PHQ-2 Score 2       Abuse: Current or Past (Physical, Sexual or Emotional) - No  Do you feel safe in your environment? Yes    Have you ever done Advance Care Planning? (For example, a Health Directive, POLST, or a discussion with a medical provider or your loved ones about your wishes): No, advance care planning information given to patient to review.  Patient declined advance care planning discussion at this time.    Social History     Tobacco Use     Smoking status: Current Every Day Smoker     Packs/day: 0.50     Years: 10.00     Pack years: 5.00     Types: Cigarettes     Smokeless tobacco: Never Used     Tobacco comment: 5 cigarettes per day   Substance Use Topics     Alcohol use: Yes     Alcohol/week: 0.0 standard drinks     Comment: 10-15 drinks per week         Alcohol Use 10/29/2021    Prescreen: >3 drinks/day or >7 drinks/week? Yes   AUDIT SCORE  6       Reviewed orders with patient.  Reviewed health maintenance and updated orders accordingly - Yes  Lab work is in process  Labs reviewed in EPIC  BP Readings from Last 3 Encounters:   10/29/21 116/84   08/05/19 118/86   07/25/18 108/84    Wt Readings from Last 3 Encounters:   10/29/21 84.8 kg (186 lb 14.4 oz)   08/05/19 81.6 kg (180 lb)   07/25/18 82.4 kg (181 lb 9.6 oz)                    Breast Cancer Screening:  Any new diagnosis of family breast, ovarian, or bowel cancer? No    FHS-7: No flowsheet data found.  click delete button to remove this line now  Mammogram Screening: Recommended mammography every 1-2 years with patient discussion and risk factor consideration  Pertinent mammograms are reviewed under the imaging tab.    History of abnormal Pap smear: NO, patient had total hysterectomy in 2016, PAP no longer indicated   PAP / HPV Latest Ref Rng & Units 7/8/2016   PAP (Historical) - NIL   HPV16 NEG Negative   HPV18 NEG Negative   HRHPV NEG Negative     Reviewed and updated as needed this visit by clinical staff   Allergies  Meds              Reviewed and updated as needed this visit by Provider                    Review of Systems   Constitutional: Negative for chills and fever.   HENT: Negative for congestion, ear pain, hearing loss and sore throat.    Eyes: Negative for pain and visual disturbance.   Respiratory: Negative for cough and shortness of breath.    Cardiovascular: Negative for chest pain, palpitations and peripheral edema.   Gastrointestinal: Negative for abdominal pain, constipation, diarrhea, heartburn, hematochezia and nausea.   Breasts:  Negative for tenderness, breast mass and discharge.   Genitourinary: Negative for dysuria, frequency, genital sores, hematuria, pelvic pain, urgency, vaginal bleeding and vaginal discharge.   Musculoskeletal: Negative for arthralgias, joint swelling and myalgias.   Skin: Negative for  "rash.   Neurological: Negative for dizziness, weakness, headaches and paresthesias.   Psychiatric/Behavioral: Negative for mood changes. The patient is not nervous/anxious.      CONSTITUTIONAL: NEGATIVE for fever, chills, change in weight  INTEGUMENTARY/SKIN: NEGATIVE for worrisome rashes, moles or lesions  EYES: NEGATIVE for vision changes or irritation  ENT: NEGATIVE for ear, mouth and throat problems  RESP: NEGATIVE for significant cough or SOB  BREAST: NEGATIVE for masses, tenderness or discharge  CV: NEGATIVE for chest pain, palpitations or peripheral edema  GI: NEGATIVE for nausea, abdominal pain, heartburn, or change in bowel habits  : NEGATIVE for unusual urinary or vaginal symptoms. No vaginal bleeding.  MUSCULOSKELETAL:POSITIVE  for left hip pain  NEURO: NEGATIVE for weakness, dizziness or paresthesias  PSYCHIATRIC: NEGATIVE for changes in mood or affect      OBJECTIVE:   /84   Pulse 83   Temp 98.2  F (36.8  C) (Tympanic)   Resp 12   Ht 1.632 m (5' 4.25\")   Wt 84.8 kg (186 lb 14.4 oz)   LMP 11/25/2015 (Approximate)   SpO2 96%   BMI 31.83 kg/m    Physical Exam  GENERAL: healthy, alert and no distress  EYES: Eyes grossly normal to inspection, PERRL and conjunctivae and sclerae normal  HENT: ear canals and TM's normal, nose and mouth without ulcers or lesions  NECK: no adenopathy, no asymmetry, masses, or scars and thyroid normal to palpation  RESP: lungs clear to auscultation - no rales, rhonchi or wheezes  CV: regular rate and rhythm, normal S1 S2, no S3 or S4, no murmur, click or rub, no peripheral edema and peripheral pulses strong   (female): normal female external genitalia, normal urethral meatus , vaginal mucosa pink, moist, well rugated and normal post-hysterectomy exam without masses.   MS: no gross musculoskeletal defects noted, no edema. Left hip limited ROM due to pain   SKIN: no suspicious lesions or rashes  NEURO: Normal strength and tone, mentation intact and speech " "normal  PSYCH: mentation appears normal, affect normal/bright    Diagnostic Test Results:  Labs reviewed in Epic    ASSESSMENT/PLAN:   (Z00.00) Annual physical exam  (primary encounter diagnosis)  Comment:   Plan: Lipid panel reflex to direct LDL Fasting, Basic        metabolic panel  (Ca, Cl, CO2, Creat, Gluc, K,         Na, BUN), traMADol (ULTRAM) 50 MG tablet,         CANCELED: Pap screen with HPV - recommended age        30 - 65 years            (Z23) Need for prophylactic vaccination and inoculation against influenza  Comment:   Plan: INFLUENZA QUAD, RECOMBINANT, P-FREE (RIV4)         (FLUBLOK)            (M25.552) Hip pain, left  Comment:  Xray showed mild osteoarthritis, patient tried Tylenol, Ibuprofen without improvement, provided with prescription for Tramadol, to use as needed for severe pain, recommended follow up with ortho for Cortisol injection   Plan: XR Hip Left 2-3 Views, Orthopedic          Referral              Patient has been advised of split billing requirements and indicates understanding: Yes  COUNSELING:  Reviewed preventive health counseling, as reflected in patient instructions       Regular exercise       Healthy diet/nutrition    Estimated body mass index is 31.83 kg/m  as calculated from the following:    Height as of this encounter: 1.632 m (5' 4.25\").    Weight as of this encounter: 84.8 kg (186 lb 14.4 oz).    Weight management plan: Discussed healthy diet and exercise guidelines          Counseling Resources:  ATP IV Guidelines  Pooled Cohorts Equation Calculator  Breast Cancer Risk Calculator  BRCA-Related Cancer Risk Assessment: FHS-7 Tool  FRAX Risk Assessment  ICSI Preventive Guidelines  Dietary Guidelines for Americans, 2010  USDA's MyPlate  ASA Prophylaxis  Lung CA Screening    YAKOV Briceno [facility]  "

## 2021-10-29 ENCOUNTER — ANCILLARY PROCEDURE (OUTPATIENT)
Dept: GENERAL RADIOLOGY | Facility: CLINIC | Age: 56
End: 2021-10-29
Attending: NURSE PRACTITIONER
Payer: COMMERCIAL

## 2021-10-29 ENCOUNTER — OFFICE VISIT (OUTPATIENT)
Dept: FAMILY MEDICINE | Facility: CLINIC | Age: 56
End: 2021-10-29
Payer: COMMERCIAL

## 2021-10-29 VITALS
TEMPERATURE: 98.2 F | HEART RATE: 83 BPM | OXYGEN SATURATION: 96 % | SYSTOLIC BLOOD PRESSURE: 116 MMHG | BODY MASS INDEX: 31.91 KG/M2 | HEIGHT: 64 IN | RESPIRATION RATE: 12 BRPM | WEIGHT: 186.9 LBS | DIASTOLIC BLOOD PRESSURE: 84 MMHG

## 2021-10-29 DIAGNOSIS — M25.552 HIP PAIN, LEFT: ICD-10-CM

## 2021-10-29 DIAGNOSIS — Z23 NEED FOR PROPHYLACTIC VACCINATION AND INOCULATION AGAINST INFLUENZA: ICD-10-CM

## 2021-10-29 DIAGNOSIS — Z00.00 ANNUAL PHYSICAL EXAM: Primary | ICD-10-CM

## 2021-10-29 PROCEDURE — 99396 PREV VISIT EST AGE 40-64: CPT | Mod: 25 | Performed by: NURSE PRACTITIONER

## 2021-10-29 PROCEDURE — 73502 X-RAY EXAM HIP UNI 2-3 VIEWS: CPT | Mod: LT | Performed by: RADIOLOGY

## 2021-10-29 PROCEDURE — 99214 OFFICE O/P EST MOD 30 MIN: CPT | Mod: 25 | Performed by: NURSE PRACTITIONER

## 2021-10-29 PROCEDURE — 90471 IMMUNIZATION ADMIN: CPT | Performed by: NURSE PRACTITIONER

## 2021-10-29 PROCEDURE — 90682 RIV4 VACC RECOMBINANT DNA IM: CPT | Performed by: NURSE PRACTITIONER

## 2021-10-29 ASSESSMENT — ENCOUNTER SYMPTOMS
WEAKNESS: 0
NERVOUS/ANXIOUS: 0
PARESTHESIAS: 0
PALPITATIONS: 0
FREQUENCY: 0
JOINT SWELLING: 0
COUGH: 0
HEADACHES: 0
EYE PAIN: 0
ABDOMINAL PAIN: 0
CHILLS: 0
BREAST MASS: 0
CONSTIPATION: 0
DYSURIA: 0
FEVER: 0
DIARRHEA: 0
DIZZINESS: 0
HEMATURIA: 0
SHORTNESS OF BREATH: 0
ARTHRALGIAS: 0
HEARTBURN: 0
SORE THROAT: 0
HEMATOCHEZIA: 0
NAUSEA: 0
MYALGIAS: 0

## 2021-10-29 ASSESSMENT — MIFFLIN-ST. JEOR: SCORE: 1431.74

## 2021-10-29 NOTE — NURSING NOTE
"Initial /84   Pulse 83   Temp 98.2  F (36.8  C) (Tympanic)   Resp 12   Ht 1.632 m (5' 4.25\")   Wt 84.8 kg (186 lb 14.4 oz)   LMP 11/25/2015 (Approximate)   SpO2 96%   BMI 31.83 kg/m   Estimated body mass index is 31.83 kg/m  as calculated from the following:    Height as of this encounter: 1.632 m (5' 4.25\").    Weight as of this encounter: 84.8 kg (186 lb 14.4 oz). .      "

## 2021-11-01 ENCOUNTER — TELEPHONE (OUTPATIENT)
Dept: FAMILY MEDICINE | Facility: CLINIC | Age: 56
End: 2021-11-01

## 2021-11-01 RX ORDER — TRAMADOL HYDROCHLORIDE 50 MG/1
50 TABLET ORAL 2 TIMES DAILY PRN
Qty: 15 TABLET | Refills: 0 | Status: SHIPPED | OUTPATIENT
Start: 2021-11-01 | End: 2022-09-28

## 2021-11-02 NOTE — TELEPHONE ENCOUNTER
Markel Velásquez,  Thank you for your message below.   This Colonoscopy has already been abstracted, see the Procedures tab in Epic.   We aren't abstracting hysterectomies at this time. Please let us know if you have any questions about this.  Thank you,  Stefani, Abstracting Team

## 2021-11-08 ENCOUNTER — OFFICE VISIT (OUTPATIENT)
Dept: ORTHOPEDICS | Facility: CLINIC | Age: 56
End: 2021-11-08
Payer: COMMERCIAL

## 2021-11-08 VITALS
SYSTOLIC BLOOD PRESSURE: 118 MMHG | HEIGHT: 64 IN | BODY MASS INDEX: 31.76 KG/M2 | DIASTOLIC BLOOD PRESSURE: 82 MMHG | WEIGHT: 186 LBS

## 2021-11-08 DIAGNOSIS — M25.552 HIP PAIN, LEFT: ICD-10-CM

## 2021-11-08 PROCEDURE — 99203 OFFICE O/P NEW LOW 30 MIN: CPT | Performed by: FAMILY MEDICINE

## 2021-11-08 ASSESSMENT — MIFFLIN-ST. JEOR: SCORE: 1427.66

## 2021-11-08 NOTE — PATIENT INSTRUCTIONS
# Left Gluteal Tendinopathy: Symptoms noted over the past 3 months in the setting of walking and standing more at work. She has had pain over the greater trochanter with pain worse with single-leg stand on left side as well as stretching out the gluteal tendons on left side. Reviewed previous x-ray showing no significant arthritis. Likely cause her pain due to gluteal tendinopathy flareup.  Counseled patient on nature of condition and treatment options.  Given this plan as below, follow-up as needed  Image Findings: no hip arthritis   Treatment: Activities as tolerated, home exercises given today  Job: no restrictions   Medications/Injections: Limited tylenol/ibuprofen for pain for 1-2 weeks, defer for now  Follow-up: In one month if symptoms do not improve, sooner if worsening  Can consider steroid injection    Please call [phone]   Ask for my team if you have any questions or concerns    If you have not yet received the influenza vaccine but would like to get one, please call  [phone] or you can schedule via [employer]    It was great seeing you today!    Kervin Montano MD, CAQSM

## 2021-11-08 NOTE — PROGRESS NOTES
ASSESSMENT & PLAN    Elaina was seen today for pain.    Diagnoses and all orders for this visit:    Hip pain, left  -     Orthopedic  Referral      This issue is acute and Improving.    # Left Gluteal Tendinopathy: Symptoms noted over the past 3 months in the setting of walking and standing more at work. She has had pain over the greater trochanter with pain worse with single-leg stand on left side as well as stretching out the gluteal tendons on left side. Reviewed previous x-ray showing no significant arthritis. Likely cause her pain due to gluteal tendinopathy flareup.  Counseled patient on nature of condition and treatment options.  Given this plan as below, follow-up as needed  Image Findings: no hip arthritis   Treatment: Activities as tolerated, home exercises given today  Job: no restrictions   Medications/Injections: Limited tylenol/ibuprofen for pain for 1-2 weeks, defer for now  Follow-up: In one month if symptoms do not improve, sooner if worsening  Can consider steroid injection     Kervin Montano MD  [facility]    -----  Chief Complaint   Patient presents with     Left Hip - Pain       SUBJECTIVE  Elaina Owens is a/an 56 year old female who is seen in consultation at the request of Meghan Thomas C.N.P. for evaluation of left hip pain.     The patient is seen by themselves.       Onset: 3 month(s) ago. Reports insidious onset without acute precipitating event.  Overuse at work. Saw PCP 10/29/21 and xrays were performed. Reviewed PCP note.   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     REVIEW OF SYSTEMS:  Review of Systems  Constitutional, HEENT, cardiovascular, pulmonary, GI, , musculoskeletal, neuro, skin, endocrine and psych systems are negative, except as otherwise noted.    OBJECTIVE:  /82   Ht 1.632 m (5' 4.25\")   Wt 84.4 kg (186 lb)   LMP 11/25/2015 (Approximate)   BMI 31.68 kg/m     General: healthy, alert and in no distress  HEENT: no scleral icterus or conjunctival erythema  Skin: no suspicious lesions or rash. No jaundice.  CV: distal perfusion intact    Resp: normal respiratory effort without conversational dyspnea   Psych: normal mood and affect  Gait: normal steady gait with appropriate coordination and balance    Neuro: Normal light sensory exam of left lowerextremity     LEFT HIP  Inspection:    No swelling, bruising, discoloration, or obvious deformity or asymmetry  Palpation:    Tender about the greater trochanteric region and gluteus medius insertion. Otherwise all other landmarks are nontender.    Crepitus is Absent  Active Range of Motion:     Flexion full, extension full / IR full / ER  limited slightly by pain over GT  Strength:    Flexion 5/5 / extension 5/5 / adduction 5/5 / abduction 5/5  Special Tests:    Positive: LEA pain over lateral hip    Negative: Logroll, anterior impingement (FADIR)      RADIOLOGY:  I independently, visualized and reviewed these images with the patient     HIP LEFT TWO - THREE VIEWS   10/29/2021 4:54 PM     HISTORY: Hip pain, left.     COMPARISON: None available.                                                                   IMPRESSION:  Anatomic alignment left hip. No acute displaced left hip fracture.  Mild left hip osteoarthritis. Left obturator ring appears intact. Mild  arthritis of the symphysis pubis.     JULES HENRY MD     Review of external notes as documented elsewhere in note  Review of the result(s) of each unique test - left hip x-ray       "

## 2021-11-15 ENCOUNTER — TELEPHONE (OUTPATIENT)
Dept: FAMILY MEDICINE | Facility: CLINIC | Age: 56
End: 2021-11-15
Payer: COMMERCIAL

## 2021-11-15 NOTE — TELEPHONE ENCOUNTER
Patient Quality Outreach Summary      Summary:    Patient is due/failing the following:   Colonoscopy and Breast Cancer Screening - Mammogram    Type of outreach:    Sent letter.    Questions for provider review:    None                                                                                                                    DANI VALLEJO     .

## 2021-11-15 NOTE — LETTER
[facility]  [address] [address]  [phone]    November 15, 2021    Elaina Owens  [address] [place] [address]          Dear Elaina,    --Mammogram screening is generally recommended every year starting at age of 50.  Some women may choose to be screened at an earlier age ( between 40 & 50) depending on risk factors and discussions with her primary provider.   Call [phone] to schedule a mammogram.  --Colon cancer screening is generally recommended in all patients over the age of 50 years of age. This can be with either colonoscopy every 10 years, or testing the stool for blood one time per year (called a FIT test, a simple card you can send back to us in the mail). On review of our electronic medical record it appears you are due for colon cancer screening. Please call the clinic to assist in setting up a colonoscopy or, if you prefer, setting up the test for stool blood(FIT).    If you have had any of these tests at an outside facility, please let us know so that we can update your record.     Please disregard this notice if you have already scheduled an appointment.     Sincerely,    Jeri GREENE

## 2022-04-08 ENCOUNTER — TELEPHONE (OUTPATIENT)
Dept: FAMILY MEDICINE | Facility: CLINIC | Age: 57
End: 2022-04-08
Payer: COMMERCIAL

## 2022-04-08 NOTE — TELEPHONE ENCOUNTER
Reason for Call:  Other Note/Letter    Detailed comments: Patient needs letter from Meghan saying she is fit enough to do the OSHA FIT test for KN95 mask. Meghan did her physical on 10/29/21    Phone Number Patient can be reached at: Home number on file [phone] (home)    Best Time: Any    Can we leave a detailed message on this number? YES    Call taken on 4/8/2022 at 12:38 PM by Joaquina Ni

## 2022-04-08 NOTE — LETTER
April 11, 2022      Elaina Owens  [address] [address]        To Whom It May Concern,    Pt is ok to do the OSHA FIT test for the KN95 mask.          Sincerely,        YAKOV Briceno CNP

## 2022-04-13 NOTE — TELEPHONE ENCOUNTER
Left letter at [facility] , per patient request. Joaquina Ni on 4/13/2022 at 10:07 AM

## 2022-05-16 ENCOUNTER — HEALTH MAINTENANCE LETTER (OUTPATIENT)
Age: 57
End: 2022-05-16

## 2022-09-11 ENCOUNTER — HEALTH MAINTENANCE LETTER (OUTPATIENT)
Age: 57
End: 2022-09-11

## 2022-09-28 ENCOUNTER — OFFICE VISIT (OUTPATIENT)
Dept: FAMILY MEDICINE | Facility: CLINIC | Age: 57
End: 2022-09-28
Payer: COMMERCIAL

## 2022-09-28 VITALS
WEIGHT: 195 LBS | BODY MASS INDEX: 32.49 KG/M2 | OXYGEN SATURATION: 97 % | DIASTOLIC BLOOD PRESSURE: 82 MMHG | HEART RATE: 91 BPM | RESPIRATION RATE: 16 BRPM | TEMPERATURE: 97.1 F | HEIGHT: 65 IN | SYSTOLIC BLOOD PRESSURE: 118 MMHG

## 2022-09-28 DIAGNOSIS — L57.0 ACTINIC KERATOSIS: Primary | ICD-10-CM

## 2022-09-28 PROCEDURE — 17000 DESTRUCT PREMALG LESION: CPT | Performed by: FAMILY MEDICINE

## 2022-09-28 ASSESSMENT — ENCOUNTER SYMPTOMS
GASTROINTESTINAL NEGATIVE: 1
CARDIOVASCULAR NEGATIVE: 1
NEUROLOGICAL NEGATIVE: 1
RESPIRATORY NEGATIVE: 1
MUSCULOSKELETAL NEGATIVE: 1
PSYCHIATRIC NEGATIVE: 1
EYE DISCHARGE: 0
CONSTITUTIONAL NEGATIVE: 1
EYES NEGATIVE: 1
HEMATOLOGIC/LYMPHATIC NEGATIVE: 1
APNEA: 0
ALLERGIC/IMMUNOLOGIC NEGATIVE: 1

## 2022-09-28 ASSESSMENT — PAIN SCALES - GENERAL: PAINLEVEL: MILD PAIN (3)

## 2022-09-28 NOTE — PROGRESS NOTES
Assessment & Plan     Actinic keratosis  Patient was reassured, Liquid nitrogen was applied to each of the AK on the neck. This was applied three times.Patient tolerated the procedure well. Cautioned that the area may be a bit irritated but skin should slough off in a few days.   - DESTRUCT BENIGN LESION, UP TO 14            FUTURE APPOINTMENTS:       - Follow-up visit in one month     Return in about 4 weeks (around 10/26/2022) for Follow up.    Guy Venegas MD  [facility]    Alistair Delaney is a 57 year old accompanied by her self, presenting for the following health issues:    Patient is a 57 yr old female here for concerns of mole on her posterior neck. She reports that she first noticed the mole a couple of weeks ago and then noticed that the mole started changing and became quite itchy and raised. She has a family history of melanoma and this got her concerned. She reports no change in soaps or products.   Derm Problem (Here to have a concerning mole evaluated on the back of the right neck.) and Health Maintenance (She has an upcoming physical scheduled with Meghan.  She will address her HM and injections at that appointment time.  Just wanted to get in today to have the mole checked.)    Chief Complaint   Patient presents with     Derm Problem     Here to have a concerning mole evaluated on the back of the right neck.     Health Maintenance     She has an upcoming physical scheduled with Meghan.  She will address her HM and injections at that appointment time.  Just wanted to get in today to have the mole checked.       History of Present Illness       Reason for visit:  Mole on neck  Symptom onset:  1-2 weeks ago  Symptoms include:  Itchy change in mole    She eats 2-3 servings of fruits and vegetables daily.She consumes 0 sweetened beverage(s) daily.She exercises with enough effort to increase her heart rate 10 to 19 minutes per day.  She exercises with enough effort to  "increase her heart rate 3 or less days per week. She is missing 1 dose(s) of medications per week.       Skin Lesion  Onset/Duration: 9-13-22 is when she noticed changes.  Description  Location: Back of the right neck.  Color: pink  Border description: raised  Character: irritation, was just a little bump and now has changed.  Itching: At first it was severe, now it is not as bad.  Bleeding:  No  Intensity:  mild  Progression of Symptoms:  Seems more stable now but was a fast change.  Accompanying signs and symptoms:   Bleeding: No  Scaling: No  Excessive sun exposure/tanning: YES- used to.  Sunscreen used: YES- more recently.  History:           Any previous history of skin cancer: No  Any family history of melanoma: YES- Father.  Previous episodes of similar lesion: No  Precipitating or alleviating factors: None  Therapies tried and outcome: none        Review of Systems   Constitutional: Negative.    HENT: Negative.    Eyes: Negative.  Negative for discharge.   Respiratory: Negative.  Negative for apnea.    Cardiovascular: Negative.    Gastrointestinal: Negative.    Breasts:  negative.    Genitourinary: Negative.    Musculoskeletal: Negative.    Skin: Positive for rash.   Allergic/Immunologic: Negative.    Neurological: Negative.    Hematological: Negative.    Psychiatric/Behavioral: Negative.             Objective    /82   Pulse 91   Temp 97.1  F (36.2  C) (Tympanic)   Resp 16   Ht 1.651 m (5' 5\")   Wt 88.5 kg (195 lb)   LMP 11/25/2015 (Approximate)   SpO2 97%   BMI 32.45 kg/m    Body mass index is 32.45 kg/m .  Physical Exam  Constitutional:       Appearance: Normal appearance.   Musculoskeletal:         General: No swelling. Normal range of motion.   Skin:     Findings: Erythema and rash present.             Comments: Actinic keratosis on posterior neck.    Neurological:      Mental Status: She is alert and oriented to person, place, and time.   Psychiatric:         Mood and Affect: Mood normal.  "        Behavior: Behavior normal.

## 2022-09-28 NOTE — PROCEDURES
After obtaining consent, two actinic keratosis were treated with liquid nitrogen, applied this three times.Patient tolerated this well.

## 2022-10-19 ENCOUNTER — OFFICE VISIT (OUTPATIENT)
Dept: FAMILY MEDICINE | Facility: CLINIC | Age: 57
End: 2022-10-19
Payer: COMMERCIAL

## 2022-10-19 VITALS
DIASTOLIC BLOOD PRESSURE: 84 MMHG | TEMPERATURE: 98.6 F | OXYGEN SATURATION: 98 % | BODY MASS INDEX: 32.15 KG/M2 | HEART RATE: 82 BPM | RESPIRATION RATE: 16 BRPM | WEIGHT: 193 LBS | HEIGHT: 65 IN | SYSTOLIC BLOOD PRESSURE: 124 MMHG

## 2022-10-19 DIAGNOSIS — Z12.11 COLON CANCER SCREENING: ICD-10-CM

## 2022-10-19 DIAGNOSIS — Z12.31 VISIT FOR SCREENING MAMMOGRAM: ICD-10-CM

## 2022-10-19 DIAGNOSIS — Z00.00 ANNUAL PHYSICAL EXAM: Primary | ICD-10-CM

## 2022-10-19 PROCEDURE — 99396 PREV VISIT EST AGE 40-64: CPT | Mod: 25 | Performed by: NURSE PRACTITIONER

## 2022-10-19 PROCEDURE — 90471 IMMUNIZATION ADMIN: CPT | Performed by: NURSE PRACTITIONER

## 2022-10-19 PROCEDURE — 90682 RIV4 VACC RECOMBINANT DNA IM: CPT | Performed by: NURSE PRACTITIONER

## 2022-10-19 ASSESSMENT — ENCOUNTER SYMPTOMS
EYE PAIN: 0
JOINT SWELLING: 0
COUGH: 0
PARESTHESIAS: 0
SORE THROAT: 0
CONSTIPATION: 0
DIARRHEA: 0
HEMATURIA: 0
CHILLS: 0
HEADACHES: 0
FEVER: 0
DYSURIA: 0
NAUSEA: 0
WEAKNESS: 0
HEARTBURN: 0
PALPITATIONS: 0
HEMATOCHEZIA: 0
MYALGIAS: 0
SHORTNESS OF BREATH: 0
ABDOMINAL PAIN: 0
DIZZINESS: 0
FREQUENCY: 0
NERVOUS/ANXIOUS: 0
BREAST MASS: 0
ARTHRALGIAS: 0

## 2022-10-19 ASSESSMENT — PAIN SCALES - GENERAL: PAINLEVEL: NO PAIN (0)

## 2022-10-19 NOTE — PROGRESS NOTES
SUBJECTIVE:   CC: Elaina is an 57 year old who presents for preventive health visit.     Chief Complaint   Patient presents with     Physical     Health Maintenance     Patient had hysterectomy      Patient has been advised of split billing requirements and indicates understanding: Yes  Healthy Habits:     Getting at least 3 servings of Calcium per day:  Yes    Bi-annual eye exam:  Yes    Dental care twice a year:  NO    Sleep apnea or symptoms of sleep apnea:  None    Diet:  Regular (no restrictions)    Frequency of exercise:  1 day/week    Duration of exercise:  45-60 minutes    Taking medications regularly:  Yes    Medication side effects:  None    PHQ-2 Total Score: 0    Additional concerns today:  Yes (She has a mole on the back of her neck. Thinks she may me allergic to the sun, has been getting a rash when she is in the sun. )    Today's PHQ-2 Score:   PHQ-2 ( 1999 Pfizer) 10/19/2022   Q1: Little interest or pleasure in doing things 0   Q2: Feeling down, depressed or hopeless 0   PHQ-2 Score 0   PHQ-2 Total Score (12-17 Years)- Positive if 3 or more points; Administer PHQ-A if positive -   Q1: Little interest or pleasure in doing things Not at all   Q2: Feeling down, depressed or hopeless Not at all   PHQ-2 Score 0     Abuse: Current or Past (Physical, Sexual or Emotional) - No  Do you feel safe in your environment? Yes    Social History     Tobacco Use     Smoking status: Every Day     Packs/day: 0.50     Years: 10.00     Pack years: 5.00     Types: Cigarettes     Smokeless tobacco: Never     Tobacco comments:     5 cigarettes per day   Substance Use Topics     Alcohol use: Yes     Alcohol/week: 0.0 standard drinks     Comment: 10-15 drinks per week     If you drink alcohol do you typically have >3 drinks per day or >7 drinks per week? Yes      Alcohol Use 10/19/2022   Prescreen: >3 drinks/day or >7 drinks/week? No   Prescreen: >3 drinks/day or >7 drinks/week? -   AUDIT SCORE  -     Reviewed orders with  patient.  Reviewed health maintenance and updated orders accordingly - Yes  Labs reviewed in EPIC  BP Readings from Last 3 Encounters:   10/19/22 124/84   09/28/22 118/82   11/08/21 118/82    Wt Readings from Last 3 Encounters:   10/19/22 87.5 kg (193 lb)   09/28/22 88.5 kg (195 lb)   11/08/21 84.4 kg (186 lb)                    Breast Cancer Screening:    Breast CA Risk Assessment (FHS-7) 10/29/2021   Do you have a family history of breast, colon, or ovarian cancer? No / Unknown       click delete button to remove this line now  Mammogram Screening: Recommended mammography every 1-2 years with patient discussion and risk factor consideration  Pertinent mammograms are reviewed under the imaging tab.    History of abnormal Pap smear: Status post benign hysterectomy. Health Maintenance and Surgical History updated.  PAP / HPV Latest Ref Rng & Units 7/8/2016   PAP (Historical) - NIL   HPV16 NEG Negative   HPV18 NEG Negative   HRHPV NEG Negative     Reviewed and updated as needed this visit by clinical staff   Tobacco  Allergies  Meds   Med Hx  Surg Hx  Fam Hx  Soc Hx        Reviewed and updated as needed this visit by Provider                     Review of Systems   Constitutional: Negative for chills and fever.   HENT: Negative for congestion, ear pain, hearing loss and sore throat.    Eyes: Negative for pain and visual disturbance.   Respiratory: Negative for cough and shortness of breath.    Cardiovascular: Negative for chest pain, palpitations and peripheral edema.   Gastrointestinal: Negative for abdominal pain, constipation, diarrhea, heartburn, hematochezia and nausea.   Breasts:  Negative for tenderness, breast mass and discharge.   Genitourinary: Negative for dysuria, frequency, genital sores, hematuria, pelvic pain, urgency, vaginal bleeding and vaginal discharge.   Musculoskeletal: Negative for arthralgias, joint swelling and myalgias.   Skin: Negative for rash.   Neurological: Negative for dizziness,  "weakness, headaches and paresthesias.   Psychiatric/Behavioral: Negative for mood changes. The patient is not nervous/anxious.      CONSTITUTIONAL: NEGATIVE for fever, chills, change in weight  INTEGUMENTARY/SKIN: NEGATIVE for worrisome rashes, moles or lesions  EYES: NEGATIVE for vision changes or irritation  ENT: NEGATIVE for ear, mouth and throat problems  RESP: NEGATIVE for significant cough or SOB  BREAST: NEGATIVE for masses, tenderness or discharge  CV: NEGATIVE for chest pain, palpitations or peripheral edema  GI: NEGATIVE for nausea, abdominal pain, heartburn, or change in bowel habits  : NEGATIVE for unusual urinary or vaginal symptoms. No vaginal bleeding.  MUSCULOSKELETAL: NEGATIVE for significant arthralgias or myalgia  NEURO: NEGATIVE for weakness, dizziness or paresthesias  PSYCHIATRIC: NEGATIVE for changes in mood or affect      OBJECTIVE:   /84 (BP Location: Left arm, Patient Position: Sitting, Cuff Size: Adult Regular)   Pulse 82   Temp 98.6  F (37  C) (Tympanic)   Resp 16   Ht 1.651 m (5' 5\")   Wt 87.5 kg (193 lb)   LMP 11/25/2015 (Approximate)   SpO2 98%   BMI 32.12 kg/m    Physical Exam  GENERAL: healthy, alert and no distress  EYES: Eyes grossly normal to inspection, PERRL and conjunctivae and sclerae normal  HENT: ear canals and TM's normal, nose and mouth without ulcers or lesions  NECK: no adenopathy, no asymmetry, masses, or scars and thyroid normal to palpation  RESP: lungs clear to auscultation - no rales, rhonchi or wheezes  CV: regular rate and rhythm, normal S1 S2, no S3 or S4, no murmur, click or rub, no peripheral edema and peripheral pulses strong  MS: no gross musculoskeletal defects noted, no edema  SKIN: no suspicious lesions or rashes  NEURO: Normal strength and tone, mentation intact and speech normal  PSYCH: mentation appears normal, affect normal/bright    Diagnostic Test Results:  Labs reviewed in Epic    ASSESSMENT/PLAN:   (Z00.00) Annual physical exam  " "(primary encounter diagnosis)  Comment:   Plan: MA SCREENING DIGITAL BILAT - Future  (s+30),         Lipid panel reflex to direct LDL Fasting, Basic        metabolic panel  (Ca, Cl, CO2, Creat, Gluc, K,         Na, BUN)      (Z12.31) Visit for screening mammogram  Plan: MA SCREENING DIGITAL BILAT - Future  (s+30)            (Z12.11) Colon cancer screening  Plan: Colonoscopy Screening  Referral            COUNSELING:  Reviewed preventive health counseling, as reflected in patient instructions       Regular exercise       Healthy diet/nutrition       Colorectal Cancer Screening    Estimated body mass index is 32.12 kg/m  as calculated from the following:    Height as of this encounter: 1.651 m (5' 5\").    Weight as of this encounter: 87.5 kg (193 lb).    Weight management plan: Discussed healthy diet and exercise guidelines      Counseling Resources:  ATP IV Guidelines  Pooled Cohorts Equation Calculator  Breast Cancer Risk Calculator  BRCA-Related Cancer Risk Assessment: FHS-7 Tool  FRAX Risk Assessment  ICSI Preventive Guidelines  Dietary Guidelines for Americans, 2010  USDA's MyPlate  ASA Prophylaxis  Lung CA Screening    YAKOV Briceno [facility]  "

## 2023-02-08 ENCOUNTER — TELEPHONE (OUTPATIENT)
Dept: FAMILY MEDICINE | Facility: CLINIC | Age: 58
End: 2023-02-08
Payer: COMMERCIAL

## 2023-02-08 NOTE — LETTER
February 8, 2023    To  Elaina Owens  [address] [place] [address]    Your team at [facility] cares about your health. We have reviewed your chart and based on our findings; we are making the following recommendations to better manage your health.     You are in particular need of attention regarding the following:     Schedule Annual MAMMOGRAPHY. [facility] scheduling number is [phone] or schedule in SocialGuidehart (self referral).  Call or MyChart message your clinic to schedule a colonoscopy, schedule/ a FIT Test, or order a Cologuard test. If you are unsure what type of test you need, please call your clinic and speak to clinic staff.   Colon cancer is now the second leading cause of cancer-related deaths in the [place] for both men and women and there are over 130,000 new cases and 50,000 deaths per year from colon cancer. Colonoscopies can prevent 90-95% of these deaths. Problem lesions can be removed before they ever become cancer. This test is not only looking for cancer, but also getting rid of precancerous lesions.   If you are under/uninsured, we recommend you contact the [employer] is a free colorectal cancer screening program that provides colonoscopies for eligible under/uninsured [place] men and women. If you are interested in receiving a free colonoscopy, please call [employer] at t [phone] (mention code [email or web address]) to see if you're eligible. Please have them send us the results.     If you have already completed these items, please contact the clinic via phone or   SocialGuidehart so your care team can review and update your records. Thank you for   choosing [facility] for your healthcare needs. For any questions,   concerns, or to schedule an appointment please contact our clinic.    Healthy Regards,      Your [facility] Care Team

## 2023-02-08 NOTE — TELEPHONE ENCOUNTER
Patient Quality Outreach    Patient is due for the following:   Colon Cancer Screening  Breast Cancer Screening - Mammogram    Next Steps:   needs to schedule cancer screenings     Type of outreach:    Sent letter.    Next Steps:  Reach out within 90 days via Letter.    Max number of attempts reached: No. Will try again in 90 days if patient still on fail list.    Questions for provider review:    None     Christen Le CMA  Chart routed to none.

## 2023-02-21 ENCOUNTER — VIRTUAL VISIT (OUTPATIENT)
Dept: FAMILY MEDICINE | Facility: CLINIC | Age: 58
End: 2023-02-21
Payer: COMMERCIAL

## 2023-02-21 DIAGNOSIS — J40 BRONCHITIS: Primary | ICD-10-CM

## 2023-02-21 PROCEDURE — 99213 OFFICE O/P EST LOW 20 MIN: CPT | Mod: VID | Performed by: FAMILY MEDICINE

## 2023-02-21 RX ORDER — AZITHROMYCIN 250 MG/1
TABLET, FILM COATED ORAL
Qty: 6 TABLET | Refills: 0 | Status: SHIPPED | OUTPATIENT
Start: 2023-02-21 | End: 2023-02-26

## 2023-02-21 ASSESSMENT — ENCOUNTER SYMPTOMS: COUGH: 1

## 2023-02-21 NOTE — PROGRESS NOTES
Elaina is a 57 year old who is being evaluated via a billable video visit.      How would you like to obtain your AVS? MyChart  If the video visit is dropped, the invitation should be resent by: Text to cell phone: [phone]  Will anyone else be joining your video visit? No          Assessment & Plan     1. Bronchitis  - azithromycin (ZITHROMAX) 250 MG tablet; Take 2 tablets (500 mg) by mouth daily for 1 day, THEN 1 tablet (250 mg) daily for 4 days.  Dispense: 6 tablet; Refill: 0      Unable to come in for exam due to lack of transportation. Smoking history. Illness progressing to productive sputum.   Will cover with Z-leisa for bronchitis. If not improving, recommend coming in for vitals and in person physical exam.   Continue with OTC agents for symptomatic management.    Return in about 8 months (around 10/19/2023) for Physical Exam with PCP / sooner as needed .    Beatrice Macias, [facility]    Subjective   Elaina is a 57 year old presenting for the following health issues:  Hoarse and Cough (Productive cough, green tint)      Cough    History of Present Illness       Reason for visit:  Over the weekend, had a raspy voice otherwise felt ok. Sun felt a little worse, did a COVID test 4 pm,neg. 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  around 3am this morning started coughing up mucus with green tint. temp is 97.3  Symptom onset:  1-3 days ago  Symptoms include:  Scratchy throat, cough, discolored mucus. no temp  Symptom intensity:  Mild  Symptom progression:  Worsening  Had these symptoms before:  Yes  Has tried/received treatment for these symptoms:  Yes  Previous treatment was successful:  Yes  Prior treatment description:  Diagnosed with pneumonia or bronchitis. Prescribed antibiotics  What makes it worse:  No antibiotics  What makes it better:  Rest and medicationShe consumes 0 sweetened beverage(s) daily.She exercises with enough effort to increase her  heart rate 10 to 19 minutes per day.  She exercises with enough effort to increase her heart rate 3 or less days per week.   She is taking medications regularly.       Felt fine on Friday, woke up Saturday with raspy throat. Traveled to [place], [place] with bad allergies. Slightly achy in joints. On Sunday, not much better. Negative covid test on Sunday afternoon. 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 Last night, 3pm started coughing, green mucus. Symptoms persistent.     Not taking tylenol without fever. Trialing mucinex. Tried Dayquil yesterday.      quit smoking, cutting back to 2-3 per day. No hx of PFTs. Develops bronchitis about once a year, feels similar to this.      at work, car in shop, stranded at home.    ROS:   No fevers. Appetite is fine. No facial pressure or ear pain.   No shortness of breath or wheezing, lungs feel slightly tight with deep inhalation and triggers touch.   No nausea, no abdominal pain, no diarrhea.      Objective           Vitals:  No vitals were obtained today due to virtual visit.    Physical Exam   GENERAL: Healthy, alert and no distress  EYES: Eyes grossly normal to inspection.  No discharge or erythema, or obvious scleral/conjunctival abnormalities.  RESP: No audible wheeze, cough, or visible cyanosis.  No visible retractions or increased work of breathing.    SKIN: Visible skin clear. No significant rash, abnormal pigmentation or lesions.  NEURO: Cranial nerves grossly intact.  Mentation and speech appropriate for age.  PSYCH: Mentation appears normal, affect normal/bright, judgement and insight intact, normal speech and appearance well-groomed.            Video-Visit Details    Type of service:  Video Visit     Originating Location (pt. Location): Home    Distant Location (provider location):  On-site  Platform used for Video Visit: Tour Desk

## 2023-06-03 ENCOUNTER — HEALTH MAINTENANCE LETTER (OUTPATIENT)
Age: 58
End: 2023-06-03

## 2023-07-03 ENCOUNTER — HOSPITAL ENCOUNTER (EMERGENCY)
Facility: CLINIC | Age: 58
Discharge: HOME | End: 2023-07-03

## 2023-09-19 ENCOUNTER — PATIENT OUTREACH (OUTPATIENT)
Dept: CARE COORDINATION | Facility: CLINIC | Age: 58
End: 2023-09-19
Payer: COMMERCIAL

## 2023-10-19 ASSESSMENT — ENCOUNTER SYMPTOMS
COUGH: 0
HEARTBURN: 1
HEMATURIA: 0
CHILLS: 0
SORE THROAT: 0
NAUSEA: 0
CONSTIPATION: 0
PARESTHESIAS: 0
JOINT SWELLING: 0
MYALGIAS: 0
PALPITATIONS: 0
DIZZINESS: 0
WEAKNESS: 0
EYE PAIN: 0
ABDOMINAL PAIN: 0
HEADACHES: 0
HEMATOCHEZIA: 0
BREAST MASS: 0
DIARRHEA: 1
SHORTNESS OF BREATH: 0
NERVOUS/ANXIOUS: 0
DYSURIA: 0
FEVER: 0
ARTHRALGIAS: 0
FREQUENCY: 1

## 2023-10-20 ENCOUNTER — OFFICE VISIT (OUTPATIENT)
Dept: FAMILY MEDICINE | Facility: CLINIC | Age: 58
End: 2023-10-20
Payer: COMMERCIAL

## 2023-10-20 VITALS
DIASTOLIC BLOOD PRESSURE: 86 MMHG | OXYGEN SATURATION: 96 % | BODY MASS INDEX: 33.3 KG/M2 | SYSTOLIC BLOOD PRESSURE: 118 MMHG | HEIGHT: 65 IN | WEIGHT: 199.9 LBS | RESPIRATION RATE: 16 BRPM | HEART RATE: 98 BPM | TEMPERATURE: 97.6 F

## 2023-10-20 DIAGNOSIS — Z00.00 ANNUAL PHYSICAL EXAM: Primary | ICD-10-CM

## 2023-10-20 DIAGNOSIS — Z12.31 VISIT FOR SCREENING MAMMOGRAM: ICD-10-CM

## 2023-10-20 DIAGNOSIS — Z23 NEED FOR PROPHYLACTIC VACCINATION AND INOCULATION AGAINST INFLUENZA: ICD-10-CM

## 2023-10-20 DIAGNOSIS — Z12.11 SCREEN FOR COLON CANCER: ICD-10-CM

## 2023-10-20 LAB
ANION GAP SERPL CALCULATED.3IONS-SCNC: 14 MMOL/L (ref 7–15)
BUN SERPL-MCNC: 19.6 MG/DL (ref 6–20)
CALCIUM SERPL-MCNC: 9.6 MG/DL (ref 8.6–10)
CHLORIDE SERPL-SCNC: 108 MMOL/L (ref 98–107)
CHOLEST SERPL-MCNC: 249 MG/DL
CREAT SERPL-MCNC: 0.79 MG/DL (ref 0.51–0.95)
DEPRECATED HCO3 PLAS-SCNC: 20 MMOL/L (ref 22–29)
EGFRCR SERPLBLD CKD-EPI 2021: 87 ML/MIN/1.73M2
GLUCOSE SERPL-MCNC: 93 MG/DL (ref 70–99)
HDLC SERPL-MCNC: 75 MG/DL
LDLC SERPL CALC-MCNC: 159 MG/DL
NONHDLC SERPL-MCNC: 174 MG/DL
POTASSIUM SERPL-SCNC: 4.1 MMOL/L (ref 3.4–5.3)
SODIUM SERPL-SCNC: 142 MMOL/L (ref 135–145)
TRIGL SERPL-MCNC: 73 MG/DL
TSH SERPL DL<=0.005 MIU/L-ACNC: 4.02 UIU/ML (ref 0.3–4.2)

## 2023-10-20 PROCEDURE — 99396 PREV VISIT EST AGE 40-64: CPT | Mod: 25 | Performed by: NURSE PRACTITIONER

## 2023-10-20 PROCEDURE — 84443 ASSAY THYROID STIM HORMONE: CPT | Performed by: NURSE PRACTITIONER

## 2023-10-20 PROCEDURE — 90471 IMMUNIZATION ADMIN: CPT | Performed by: NURSE PRACTITIONER

## 2023-10-20 PROCEDURE — 90480 ADMN SARSCOV2 VAC 1/ONLY CMP: CPT | Performed by: NURSE PRACTITIONER

## 2023-10-20 PROCEDURE — 80061 LIPID PANEL: CPT | Performed by: NURSE PRACTITIONER

## 2023-10-20 PROCEDURE — 91320 SARSCV2 VAC 30MCG TRS-SUC IM: CPT | Performed by: NURSE PRACTITIONER

## 2023-10-20 PROCEDURE — 90682 RIV4 VACC RECOMBINANT DNA IM: CPT | Performed by: NURSE PRACTITIONER

## 2023-10-20 PROCEDURE — 80048 BASIC METABOLIC PNL TOTAL CA: CPT | Performed by: NURSE PRACTITIONER

## 2023-10-20 PROCEDURE — 36415 COLL VENOUS BLD VENIPUNCTURE: CPT | Performed by: NURSE PRACTITIONER

## 2023-10-20 RX ORDER — CETIRIZINE HYDROCHLORIDE 10 MG/1
TABLET, CHEWABLE ORAL DAILY
COMMUNITY
Start: 2023-01-01

## 2023-10-20 ASSESSMENT — ENCOUNTER SYMPTOMS
PARESTHESIAS: 0
ARTHRALGIAS: 0
HEARTBURN: 1
CONSTIPATION: 0
EYE PAIN: 0
PALPITATIONS: 0
HEADACHES: 0
JOINT SWELLING: 0
NERVOUS/ANXIOUS: 0
DIZZINESS: 0
HEMATOCHEZIA: 0
DIARRHEA: 1
WEAKNESS: 0
COUGH: 0
SORE THROAT: 0
SHORTNESS OF BREATH: 0
ABDOMINAL PAIN: 0
NAUSEA: 0
MYALGIAS: 0
BREAST MASS: 0
FREQUENCY: 1
CHILLS: 0
DYSURIA: 0
FEVER: 0
HEMATURIA: 0

## 2023-10-20 ASSESSMENT — PAIN SCALES - GENERAL: PAINLEVEL: NO PAIN (0)

## 2023-10-20 NOTE — PROGRESS NOTES
SUBJECTIVE:   CC: Elaina is an 58 year old who presents for preventive health visit.       10/20/2023     6:56 AM   Additional Questions   Roomed by emily   Accompanied by self         10/20/2023     6:56 AM   Patient Reported Additional Medications   Patient reports taking the following new medications Umary 850mg      Healthy Habits:     Getting at least 3 servings of Calcium per day:  Yes    Bi-annual eye exam:  NO    Dental care twice a year:  NO    Sleep apnea or symptoms of sleep apnea:  None    Diet:  Regular (no restrictions)    Duration of exercise:  15-30 minutes    Taking medications regularly:  Yes    Medication side effects:  None    Additional concerns today:  No    Social History     Tobacco Use    Smoking status: Every Day     Packs/day: 0.50     Years: 10.00     Additional pack years: 0.00     Total pack years: 5.00     Types: Cigarettes    Smokeless tobacco: Never    Tobacco comments:     5 cigarettes per day   Substance Use Topics    Alcohol use: Yes     Comment: 10-15 drinks per week             10/19/2023     8:00 PM   Alcohol Use   Prescreen: >3 drinks/day or >7 drinks/week? No     Reviewed orders with patient.  Reviewed health maintenance and updated orders accordingly - Yes  Lab work is in process  Labs reviewed in EPIC  BP Readings from Last 3 Encounters:   10/20/23 118/86   10/19/22 124/84   09/28/22 118/82    Wt Readings from Last 3 Encounters:   10/20/23 90.7 kg (199 lb 14.4 oz)   10/19/22 87.5 kg (193 lb)   09/28/22 88.5 kg (195 lb)                    Breast Cancer Screening:        10/29/2021     3:35 PM   Breast CA Risk Assessment (FHS-7)   Do you have a family history of breast, colon, or ovarian cancer? No / Unknown       click delete button to remove this line now  Mammogram Screening: Recommended mammography every 1-2 years with patient discussion and risk factor consideration  Pertinent mammograms are reviewed under the imaging tab.    History of abnormal Pap smear: Status  "post benign hysterectomy. Health Maintenance and Surgical History updated.      Latest Ref Rng & Units 7/8/2016    10:43 AM 7/8/2016    12:00 AM   PAP / HPV   PAP (Historical)   NIL    HPV 16 DNA NEG Negative     HPV 18 DNA NEG Negative     Other HR HPV NEG Negative       Reviewed and updated as needed this visit by clinical staff   Tobacco  Allergies  Meds   Med Hx  Surg Hx           Reviewed and updated as needed this visit by Provider     Meds   Med Hx  Surg Hx              Review of Systems   Constitutional:  Negative for chills and fever.   HENT:  Negative for congestion, ear pain, hearing loss and sore throat.    Eyes:  Negative for pain and visual disturbance.   Respiratory:  Negative for cough and shortness of breath.    Cardiovascular:  Negative for chest pain, palpitations and peripheral edema.   Gastrointestinal:  Positive for diarrhea and heartburn. Negative for abdominal pain, constipation, hematochezia and nausea.   Breasts:  Negative for tenderness, breast mass and discharge.   Genitourinary:  Positive for frequency. Negative for dysuria, genital sores, hematuria, pelvic pain, urgency, vaginal bleeding and vaginal discharge.   Musculoskeletal:  Negative for arthralgias, joint swelling and myalgias.   Skin:  Negative for rash.   Neurological:  Negative for dizziness, weakness, headaches and paresthesias.   Psychiatric/Behavioral:  Negative for mood changes. The patient is not nervous/anxious.           OBJECTIVE:   /86   Pulse 98   Temp 97.6  F (36.4  C) (Tympanic)   Resp 16   Ht 1.638 m (5' 4.5\")   Wt 90.7 kg (199 lb 14.4 oz)   LMP 11/25/2015 (Approximate)   SpO2 96%   BMI 33.78 kg/m    Physical Exam  GENERAL: healthy, alert and no distress  EYES: Eyes grossly normal to inspection, PERRL and conjunctivae and sclerae normal  HENT: ear canals and TM's normal, nose and mouth without ulcers or lesions  NECK: no adenopathy, no asymmetry, masses, or scars and thyroid normal to " palpation  RESP: lungs clear to auscultation - no rales, rhonchi or wheezes  CV: regular rate and rhythm, normal S1 S2, no S3 or S4, no murmur, click or rub, no peripheral edema and peripheral pulses strong  MS: no gross musculoskeletal defects noted, no edema  SKIN: no suspicious lesions or rashes  NEURO: Normal strength and tone, mentation intact and speech normal  PSYCH: mentation appears normal, affect normal/bright    Diagnostic Test Results:  Labs reviewed in Epic    ASSESSMENT/PLAN:   (Z00.00) Annual physical exam  (primary encounter diagnosis)    Plan: Lipid panel reflex to direct LDL Non-fasting,         Basic metabolic panel  (Ca, Cl, CO2, Creat,         Gluc, K, Na, BUN), TSH with free T4 reflex        Discussed healthy diet, recommended to exercise daily, try to work on weight loss and quit smoking     (Z12.31) Visit for screening mammogram  Plan: MA SCREENING DIGITAL BILAT - Future  (s+30)            (Z12.11) Screen for colon cancer  Plan: Colonoscopy Screening  Referral                COUNSELING:  Reviewed preventive health counseling, as reflected in patient instructions       Regular exercise       Healthy diet/nutrition       Colorectal Cancer Screening        She reports that she has been smoking cigarettes. She has a 5.00 pack-year smoking history. She has never used smokeless tobacco.  Nicotine/Tobacco Cessation Plan:   Information offered: Patient not interested at this time        YAKOV Briceno [facility]

## 2024-02-08 ENCOUNTER — TELEPHONE (OUTPATIENT)
Dept: FAMILY MEDICINE | Facility: CLINIC | Age: 59
End: 2024-02-08
Payer: COMMERCIAL

## 2024-02-08 NOTE — TELEPHONE ENCOUNTER
Patient Quality Outreach    Patient is due for the following:   Colon Cancer Screening  Breast Cancer Screening - Mammogram    Next Steps:   Schedule mammogram and colonoscopy     Type of outreach:    Sent letter.    Next Steps:  Reach out within 90 days via Letter.    Max number of attempts reached: No. Will try again in 90 days if patient still on fail list.    Questions for provider review:    None           Christen Le CMA  Chart routed to none.

## 2024-02-08 NOTE — LETTER
February 8, 2024    To  Elaina VEGA Roman  [address] [place] [address]    Your team at [facility] cares about your health. We have reviewed your chart and based on our findings; we are making the following recommendations to better manage your health.     You are in particular need of attention regarding the following:     Schedule Annual MAMMOGRAPHY. [facility] scheduling number is [phone] or schedule in Smartvuehart (self referral).  1 in 8 women will develop invasive breast cancer during her lifetime and it is the most common non-skin cancer in [place]. EARLY detection, new treatments, and a better understanding of the disease have increased survival rates- the 5 year survival rate in the 1960's was 63% and today it is close to 90%.  Call or MyChart message your clinic to schedule a colonoscopy, schedule/ a FIT Test, or order a Cologuard test. If you are unsure what type of test you need, please call your clinic and speak to clinic staff.   Colon cancer is now the second leading cause of cancer-related deaths in the [place] for both men and women and there are over 130,000 new cases and 50,000 deaths per year from colon cancer. Colonoscopies can prevent 90-95% of these deaths. Problem lesions can be removed before they ever become cancer. This test is not only looking for cancer, but also getting rid of precancerous lesions.   Please call [phone] to schedule a colonoscopy.  Contact your clinic to schedule/ your FIT Test.     If you have already completed these items, please contact the clinic via phone or   Smartvuehart so your care team can review and update your records. Thank you for   choosing [facility] for your healthcare needs. For any questions,   concerns, or to schedule an appointment please contact our clinic.    Healthy Regards,      Your [facility] Care Team

## 2024-04-09 ENCOUNTER — MYC MEDICAL ADVICE (OUTPATIENT)
Dept: FAMILY MEDICINE | Facility: CLINIC | Age: 59
End: 2024-04-09
Payer: COMMERCIAL

## 2024-04-10 NOTE — TELEPHONE ENCOUNTER
Jeri, Per My Chart message pt is requesting a weight loss medicine.  She had a visit with Meghan and talked about weight loss in October.

## 2024-04-25 ENCOUNTER — OFFICE VISIT (OUTPATIENT)
Dept: FAMILY MEDICINE | Facility: CLINIC | Age: 59
End: 2024-04-25
Payer: COMMERCIAL

## 2024-04-25 VITALS
SYSTOLIC BLOOD PRESSURE: 124 MMHG | HEIGHT: 65 IN | BODY MASS INDEX: 34.66 KG/M2 | HEART RATE: 82 BPM | OXYGEN SATURATION: 96 % | RESPIRATION RATE: 16 BRPM | DIASTOLIC BLOOD PRESSURE: 88 MMHG | WEIGHT: 208 LBS | TEMPERATURE: 96.7 F

## 2024-04-25 DIAGNOSIS — Z12.11 SCREEN FOR COLON CANCER: ICD-10-CM

## 2024-04-25 DIAGNOSIS — E66.01 CLASS 2 SEVERE OBESITY DUE TO EXCESS CALORIES WITH SERIOUS COMORBIDITY AND BODY MASS INDEX (BMI) OF 35.0 TO 35.9 IN ADULT (H): Primary | ICD-10-CM

## 2024-04-25 DIAGNOSIS — E66.812 CLASS 2 SEVERE OBESITY DUE TO EXCESS CALORIES WITH SERIOUS COMORBIDITY AND BODY MASS INDEX (BMI) OF 35.0 TO 35.9 IN ADULT (H): Primary | ICD-10-CM

## 2024-04-25 PROCEDURE — 99213 OFFICE O/P EST LOW 20 MIN: CPT | Performed by: NURSE PRACTITIONER

## 2024-04-25 RX ORDER — PHENTERMINE HYDROCHLORIDE 15 MG/1
15 CAPSULE ORAL EVERY MORNING
Qty: 30 CAPSULE | Refills: 2 | Status: SHIPPED | OUTPATIENT
Start: 2024-04-25 | End: 2024-07-10

## 2024-04-25 ASSESSMENT — PAIN SCALES - GENERAL: PAINLEVEL: NO PAIN (0)

## 2024-04-25 NOTE — PROGRESS NOTES
Preventive Care Visit  [facility]  Mgehan Thomas, APRN CNP, Family Medicine  Apr 25, 2024  {Provider  Link to [facility] :858031}    {PROVIDER CHARTING PREFERENCE:694522}    Alistair Delaney is a 58 year old, presenting for the following:  Physical        4/25/2024     8:08 AM   Additional Questions   Roomed by emily   Accompanied by self         4/25/2024     8:08 AM   Patient Reported Additional Medications   Patient reports taking the following new medications none        Health Care Directive  Patient does not have a Health Care Directive or Living Will: {ADVANCE_DIRECTIVE_STATUS:263479}    Healthy Habits:     Taking medications regularly:  1  History of Present Illness       Reason for visit:  Continued conversation    She eats 2-3 servings of fruits and vegetables daily.She consumes 0 sweetened beverage(s) daily.She exercises with enough effort to increase her heart rate 20 to 29 minutes per day.  She exercises with enough effort to increase her heart rate 5 days per week. She is missing 1 dose(s) of medications per week.    ***  {MA/LPN/RN Pre-Provider Visit Orders- hCG/UA/Strep (Optional):440111}  {SUPERLIST (Optional):776529}  {additonal problems for provider to add (Optional):823171}       No data to display                  10/19/2023   Nutrition   Three or more servings of calcium each day? Yes   Diet: regular (no restrictions)         10/19/2022   Exercise   Frequency of exercise: 1 day/week         10/19/2023   Social Factors   Worry food won't last until get money to buy more No   Food not last or not have enough money for food? No   Do you have housing?  Yes   Are you worried about losing your housing? No   Lack of transportation? No   Unable to get utilities (heat,electricity)? No         10/19/2023   Dental   Dentist two times every year? No            Today's PHQ-2 Score:       4/25/2024     8:06 AM   PHQ-2 ( 1999 [employer])   Q1: Little interest or pleasure in doing  "things 0   Q2: Feeling down, depressed or hopeless 0   PHQ-2 Score 0   Q1: Little interest or pleasure in doing things Not at all   Q2: Feeling down, depressed or hopeless Not at all   PHQ-2 Score 0           10/19/2023   Substance Use   Alcohol more than 3/day or more than 7/wk No     Social History     Tobacco Use    Smoking status: Every Day     Current packs/day: 0.50     Average packs/day: 0.5 packs/day for 10.0 years (5.0 ttl pk-yrs)     Types: Cigarettes    Smokeless tobacco: Never    Tobacco comments:     5 cigarettes per day   Vaping Use    Vaping status: Never Used   Substance Use Topics    Alcohol use: Yes     Comment: 10-15 drinks per week    Drug use: No     {Provider  If there are gaps in the social history shown above, please follow the link to update and then refresh the note Link to Social and Substance History :219044}     {Mammogram Decision Support (Optional):641246}      History of abnormal Pap smear: { :199538}        Latest Ref Rng & Units 7/8/2016    10:43 AM 7/8/2016    12:00 AM   PAP / HPV   PAP (Historical)   NIL    HPV 16 DNA NEG Negative     HPV 18 DNA NEG Negative     Other HR HPV NEG Negative       ASCVD Risk   The 10-year ASCVD risk score (Preet HERRERA, et al., 2019) is: 4.5%    Values used to calculate the score:      Age: 58 years      Sex: Female      Is Non- : No      Diabetic: No      Tobacco smoker: Yes      Systolic Blood Pressure: 118 mmHg      Is BP treated: No      HDL Cholesterol: 75 mg/dL      Total Cholesterol: 249 mg/dL    {Link to Fracture Risk Assessment Tool (Optional):274282}    {Provider  Use the storyboard to review patient history, after sections have been marked as reviewed, refresh note to capture documentation:038315}   Reviewed and updated as needed this visit by Provider                    {HISTORY OPTIONS (Optional):858736}    {ROS Picklists (Optional):587724}     Objective    Exam  Ht 1.638 m (5' 4.5\")   LMP 11/25/2015 " "(Approximate)   BMI 33.78 kg/m     Estimated body mass index is 33.78 kg/m  as calculated from the following:    Height as of this encounter: 1.638 m (5' 4.5\").    Weight as of 10/20/23: 90.7 kg (199 lb 14.4 oz).    Physical Exam  {Exam Choices (Optional):939781}        Signed Electronically by: YAKOV Briceno CNP  {Email feedback regarding this note to [email or web address]   :668767}  "

## 2024-04-25 NOTE — PROGRESS NOTES
"  Assessment & Plan     Class 2 severe obesity due to excess calories with serious comorbidity and body mass index (BMI) of 35.0 to 35.9 in adult (H)  -discussed few options for weight loss, including Phentermine, Contrave, Topamax and GLP1's. Patient would like to try Phentermine, discussed possible side effects, recommended use for up to 12 weeks than stop, if desire more weight loss in future may repeat treatment again in 3-4 months    - phentermine 15 MG capsule; Take 1 capsule (15 mg) by mouth every morning    Screen for colon cancer    - Colonoscopy Screening  Referral; Future      BMI  Estimated body mass index is 35.15 kg/m  as calculated from the following:    Height as of this encounter: 1.638 m (5' 4.5\").    Weight as of this encounter: 94.3 kg (208 lb).   Weight management plan: Discussed healthy diet and exercise guidelines        Alistair Delaney is a 58 year old, presenting for the following health issues:  Weight Loss (Would like to discuss options for weight loss )        4/25/2024     8:08 AM   Additional Questions   Roomed by emily   Accompanied by self         4/25/2024     8:08 AM   Patient Reported Additional Medications   Patient reports taking the following new medications none     History of Present Illness       Reason for visit:  Continued conversation    She eats 2-3 servings of fruits and vegetables daily.She consumes 0 sweetened beverage(s) daily.She exercises with enough effort to increase her heart rate 20 to 29 minutes per day.  She exercises with enough effort to increase her heart rate 5 days per week. She is missing 1 dose(s) of medications per week.         Review of Systems  Constitutional, HEENT, cardiovascular, pulmonary, gi and gu systems are negative, except as otherwise noted.      Objective    /88   Pulse 82   Temp (!) 96.7  F (35.9  C) (Tympanic)   Resp 16   Ht 1.638 m (5' 4.5\")   Wt 94.3 kg (208 lb)   LMP 11/25/2015 (Approximate)   SpO2 96%   " BMI 35.15 kg/m    Body mass index is 35.15 kg/m .  Physical Exam   GENERAL: alert and no distress  EYES: Eyes grossly normal to inspection, PERRL and conjunctivae and sclerae normal  RESP: lungs clear to auscultation - no rales, rhonchi or wheezes  CV: regular rate and rhythm, normal S1 S2, no S3 or S4, no murmur, click or rub, no peripheral edema   NEURO: Normal strength and tone, mentation intact and speech normal  PSYCH: mentation appears normal, affect normal/bright            Signed Electronically by: YAKOV Briceno CNP

## 2024-05-24 ENCOUNTER — MYC MEDICAL ADVICE (OUTPATIENT)
Dept: FAMILY MEDICINE | Facility: CLINIC | Age: 59
End: 2024-05-24
Payer: COMMERCIAL

## 2024-05-24 DIAGNOSIS — E66.811 CLASS 1 OBESITY WITHOUT SERIOUS COMORBIDITY IN ADULT, UNSPECIFIED BMI, UNSPECIFIED OBESITY TYPE: Primary | ICD-10-CM

## 2024-05-28 NOTE — TELEPHONE ENCOUNTER
See [employer] message with pt update, routing to PCP as FYI.    Heather Mendoza RN  [facility]

## 2024-06-14 RX ORDER — TOPIRAMATE 25 MG/1
25 TABLET, FILM COATED ORAL 2 TIMES DAILY
Qty: 60 TABLET | Refills: 1 | Status: SHIPPED | OUTPATIENT
Start: 2024-06-14 | End: 2024-07-10

## 2024-06-14 NOTE — TELEPHONE ENCOUNTER
Meghan,    Please see MyChart update below.    Thank you  Asa VEGA RN  [facility]

## 2024-07-05 ENCOUNTER — MYC MEDICAL ADVICE (OUTPATIENT)
Dept: FAMILY MEDICINE | Facility: CLINIC | Age: 59
End: 2024-07-05
Payer: COMMERCIAL

## 2024-07-05 DIAGNOSIS — E66.01 CLASS 2 SEVERE OBESITY DUE TO EXCESS CALORIES WITH SERIOUS COMORBIDITY AND BODY MASS INDEX (BMI) OF 35.0 TO 35.9 IN ADULT (H): ICD-10-CM

## 2024-07-05 DIAGNOSIS — E66.812 CLASS 2 SEVERE OBESITY DUE TO EXCESS CALORIES WITH SERIOUS COMORBIDITY AND BODY MASS INDEX (BMI) OF 35.0 TO 35.9 IN ADULT (H): ICD-10-CM

## 2024-07-10 RX ORDER — PHENTERMINE HYDROCHLORIDE 15 MG/1
15 CAPSULE ORAL EVERY MORNING
Qty: 30 CAPSULE | Refills: 1 | Status: SHIPPED | OUTPATIENT
Start: 2024-07-10

## 2024-07-10 RX ORDER — TOPIRAMATE 25 MG/1
25 TABLET, FILM COATED ORAL 2 TIMES DAILY
Qty: 60 TABLET | Refills: 1 | Status: SHIPPED | OUTPATIENT
Start: 2024-07-10

## 2024-07-11 NOTE — TELEPHONE ENCOUNTER
Meghan,    Please see [employer] message below and advise.     Thank you  Asa VEGA RN  [facility]

## 2024-07-13 ENCOUNTER — HEALTH MAINTENANCE LETTER (OUTPATIENT)
Age: 59
End: 2024-07-13

## 2024-08-22 ENCOUNTER — TELEPHONE (OUTPATIENT)
Dept: FAMILY MEDICINE | Facility: CLINIC | Age: 59
End: 2024-08-22
Payer: COMMERCIAL

## 2024-08-22 NOTE — LETTER
August 22, 2024    To  Elaina Owens  [address] [place] [address]    Your team at [facility] cares about your health. We have reviewed your chart and based on our findings; we are making the following recommendations to better manage your health.     You are in particular need of attention regarding the following:     Call or MyChart message your clinic to schedule a colonoscopy, schedule/ a FIT Test, or order a Cologuard test. If you are unsure what type of test you need, please call your clinic and speak to clinic staff.   Colon cancer is now the second leading cause of cancer-related deaths in the [place] for both men and women and there are over 130,000 new cases and 50,000 deaths per year from colon cancer. Colonoscopies can prevent 90-95% of these deaths. Problem lesions can be removed before they ever become cancer. This test is not only looking for cancer, but also getting rid of precancerous lesions.   Please call [phone] to schedule a colonoscopy.  Contact your clinic to schedule/ your FIT Test.   Schedule Annual MAMMOGRAPHY. [facility] scheduling number is [phone] or schedule in [employer] (self referral).    If you have already completed these items, please contact the clinic via phone or   [employer] so your care team can review and update your records. Thank you for   choosing [facility] for your healthcare needs. For any questions,   concerns, or to schedule an appointment please contact our clinic.    Healthy Regards,      Your [facility] Care Team

## 2024-10-09 DIAGNOSIS — E66.812 CLASS 2 SEVERE OBESITY DUE TO EXCESS CALORIES WITH SERIOUS COMORBIDITY AND BODY MASS INDEX (BMI) OF 35.0 TO 35.9 IN ADULT (H): ICD-10-CM

## 2024-10-09 DIAGNOSIS — E66.01 CLASS 2 SEVERE OBESITY DUE TO EXCESS CALORIES WITH SERIOUS COMORBIDITY AND BODY MASS INDEX (BMI) OF 35.0 TO 35.9 IN ADULT (H): ICD-10-CM

## 2024-10-09 RX ORDER — PHENTERMINE HYDROCHLORIDE 15 MG/1
15 CAPSULE ORAL EVERY MORNING
Qty: 30 CAPSULE | Refills: 0 | Status: SHIPPED | OUTPATIENT
Start: 2024-10-09 | End: 2024-10-23

## 2024-10-23 ENCOUNTER — OFFICE VISIT (OUTPATIENT)
Dept: FAMILY MEDICINE | Facility: CLINIC | Age: 59
End: 2024-10-23
Payer: COMMERCIAL

## 2024-10-23 VITALS
OXYGEN SATURATION: 96 % | DIASTOLIC BLOOD PRESSURE: 84 MMHG | RESPIRATION RATE: 16 BRPM | HEART RATE: 86 BPM | SYSTOLIC BLOOD PRESSURE: 110 MMHG | BODY MASS INDEX: 30.77 KG/M2 | WEIGHT: 184.7 LBS | HEIGHT: 65 IN | TEMPERATURE: 97 F

## 2024-10-23 DIAGNOSIS — Z00.00 ANNUAL PHYSICAL EXAM: Primary | ICD-10-CM

## 2024-10-23 DIAGNOSIS — Z12.31 VISIT FOR SCREENING MAMMOGRAM: ICD-10-CM

## 2024-10-23 DIAGNOSIS — E66.01 CLASS 2 SEVERE OBESITY DUE TO EXCESS CALORIES WITH SERIOUS COMORBIDITY AND BODY MASS INDEX (BMI) OF 35.0 TO 35.9 IN ADULT (H): ICD-10-CM

## 2024-10-23 DIAGNOSIS — J30.2 SEASONAL ALLERGIC RHINITIS, UNSPECIFIED TRIGGER: ICD-10-CM

## 2024-10-23 DIAGNOSIS — E66.812 CLASS 2 SEVERE OBESITY DUE TO EXCESS CALORIES WITH SERIOUS COMORBIDITY AND BODY MASS INDEX (BMI) OF 35.0 TO 35.9 IN ADULT (H): ICD-10-CM

## 2024-10-23 PROCEDURE — 99396 PREV VISIT EST AGE 40-64: CPT | Mod: 25 | Performed by: NURSE PRACTITIONER

## 2024-10-23 PROCEDURE — 90673 RIV3 VACCINE NO PRESERV IM: CPT | Performed by: NURSE PRACTITIONER

## 2024-10-23 PROCEDURE — 99213 OFFICE O/P EST LOW 20 MIN: CPT | Mod: 25 | Performed by: NURSE PRACTITIONER

## 2024-10-23 PROCEDURE — 90480 ADMN SARSCOV2 VAC 1/ONLY CMP: CPT | Performed by: NURSE PRACTITIONER

## 2024-10-23 PROCEDURE — 90471 IMMUNIZATION ADMIN: CPT | Performed by: NURSE PRACTITIONER

## 2024-10-23 PROCEDURE — 91320 SARSCV2 VAC 30MCG TRS-SUC IM: CPT | Performed by: NURSE PRACTITIONER

## 2024-10-23 RX ORDER — PHENTERMINE HYDROCHLORIDE 15 MG/1
15 CAPSULE ORAL EVERY MORNING
Qty: 30 CAPSULE | Refills: 2 | Status: SHIPPED | OUTPATIENT
Start: 2024-10-23

## 2024-10-23 RX ORDER — TOPIRAMATE 25 MG/1
25 TABLET, FILM COATED ORAL 2 TIMES DAILY
Qty: 60 TABLET | Refills: 2 | Status: SHIPPED | OUTPATIENT
Start: 2024-10-23

## 2024-10-23 RX ORDER — MONTELUKAST SODIUM 10 MG/1
10 TABLET ORAL AT BEDTIME
Qty: 90 TABLET | Refills: 3 | Status: SHIPPED | OUTPATIENT
Start: 2024-10-23

## 2024-10-23 SDOH — HEALTH STABILITY: PHYSICAL HEALTH: ON AVERAGE, HOW MANY DAYS PER WEEK DO YOU ENGAGE IN MODERATE TO STRENUOUS EXERCISE (LIKE A BRISK WALK)?: 4 DAYS

## 2024-10-23 SDOH — HEALTH STABILITY: PHYSICAL HEALTH: ON AVERAGE, HOW MANY MINUTES DO YOU ENGAGE IN EXERCISE AT THIS LEVEL?: 20 MIN

## 2024-10-23 ASSESSMENT — SOCIAL DETERMINANTS OF HEALTH (SDOH): HOW OFTEN DO YOU GET TOGETHER WITH FRIENDS OR RELATIVES?: ONCE A WEEK

## 2024-10-23 NOTE — PROGRESS NOTES
Preventive Care Visit  [facility]  YAKOV Briceno CNP, Family Medicine      Assessment & Plan     Annual physical exam    - REVIEW OF HEALTH MAINTENANCE PROTOCOL ORDERS  - MA Screening Bilateral w/ Carlos; Future  - Lipid panel reflex to direct LDL Fasting; Future    Visit for screening mammogram  -reminded patient that she is due for annual screening mammogram     Class 2 severe obesity due to excess calories with serious comorbidity and body mass index (BMI) of 35.0 to 35.9 in adult (H)  -no side effects, patient lost about 25 lbs   - phentermine 15 MG capsule; Take 1 capsule (15 mg) by mouth every morning.  - topiramate (TOPAMAX) 25 MG tablet; Take 1 tablet (25 mg) by mouth 2 times daily.  - Lipid panel reflex to direct LDL Fasting; Future    Seasonal allergic rhinitis, unspecified trigger    - montelukast (SINGULAIR) 10 MG tablet; Take 1 tablet (10 mg) by mouth at bedtime.      Counseling  Appropriate preventive services were addressed with this patient via screening, questionnaire, or discussion as appropriate for fall prevention, nutrition, physical activity, Tobacco-use cessation, social engagement, weight loss and cognition.  Checklist reviewing preventive services available has been given to the patient.  Reviewed patient's diet, addressing concerns and/or questions.   The patient reports drinking more than 3 alcoholic drinks per day and/or more than 7 drhnks per week. The patient was counseled and given information about possible harmful effects of excessive alcohol intake.      Alistair Delaney is a 59 year old, presenting for the following:  Physical        10/23/2024     7:43 AM   Additional Questions   Roomed by emily   Accompanied by self         10/23/2024     7:43 AM   Patient Reported Additional Medications   Patient reports taking the following new medications none          HPI    Health Care Directive  Patient does not have a Health Care Directive: Advance  Directive received and scanned. Click on Code in the patient header to view.      10/23/2024   General Health   How would you rate your overall physical health? Good   Feel stress (tense, anxious, or unable to sleep) To some extent      (!) STRESS CONCERN      10/23/2024   Nutrition   Three or more servings of calcium each day? Yes   Diet: Regular (no restrictions)   How many servings of fruit and vegetables per day? (!) 2-3   How many sweetened beverages each day? 0-1            10/23/2024   Exercise   Days per week of moderate/strenous exercise 4 days   Average minutes spent exercising at this level 20 min            10/23/2024   Social Factors   Frequency of gathering with friends or relatives Once a week   Worry food won't last until get money to buy more No   Food not last or not have enough money for food? No   Do you have housing? (Housing is defined as stable permanent housing and does not include staying ouside in a car, in a tent, in an abandoned building, in an overnight shelter, or couch-surfing.) Yes   Are you worried about losing your housing? No   Lack of transportation? No   Unable to get utilities (heat,electricity)? No            10/23/2024   Fall Risk   Fallen 2 or more times in the past year? No    Trouble with walking or balance? No        Patient-reported          10/23/2024   Dental   Dentist two times every year? Yes            10/23/2024   TB Screening   Were you born outside of the [place]? Yes        Today's PHQ-2 Score:       4/25/2024     8:06 AM   PHQ-2 ( 1999 Pf[employer])   Q1: Little interest or pleasure in doing things 0    Q2: Feeling down, depressed or hopeless 0    PHQ-2 Score 0   Q1: Little interest or pleasure in doing things Not at all   Q2: Feeling down, depressed or hopeless Not at all   PHQ-2 Score 0       Patient-reported         10/23/2024   Substance Use   Alcohol more than 3/day or more than 7/wk Yes   How often do you have a drink containing alcohol 2 to 3 times a week   How many  alcohol drinks on typical day 1 or 2   How often do you have 5+ drinks at one occasion Less than monthly   Audit 2/3 Score 1   How often not able to stop drinking once started Never   How often failed to do what normally expected Never   How often needed first drink in am after a heavy drinking session Never   How often feeling of guilt or remorse after drinking Never   How often unable to remember what happened the night before Never   Have you or someone else been injured because of your drinking No   Has anyone been concerned or suggested you cut down on drinking No   TOTAL SCORE - AUDIT 4   Do you use any other substances recreationally? No        Social History     Tobacco Use    Smoking status: Every Day     Current packs/day: 0.50     Average packs/day: 0.5 packs/day for 10.0 years (5.0 ttl pk-yrs)     Types: Cigarettes    Smokeless tobacco: Never    Tobacco comments:     5 cigarettes per day   Vaping Use    Vaping status: Never Used   Substance Use Topics    Alcohol use: Yes     Comment: 10-15 drinks per week    Drug use: No          Mammogram Screening - Mammogram every 1-2 years updated in Health Maintenance based on mutual decision making        10/23/2024   STI Screening   New sexual partner(s) since last STI/HIV test? No            Latest Ref Rng & Units 7/8/2016    10:43 AM 7/8/2016    12:00 AM   PAP / HPV   PAP (Historical)   NIL    HPV 16 DNA NEG Negative     HPV 18 DNA NEG Negative     Other HR HPV NEG Negative       ASCVD Risk   The 10-year ASCVD risk score (Preet HERRERA, et al., 2019) is: 4.3%    Values used to calculate the score:      Age: 59 years      Sex: Female      Is Non- : No      Diabetic: No      Tobacco smoker: Yes      Systolic Blood Pressure: 110 mmHg      Is BP treated: No      HDL Cholesterol: 75 mg/dL      Total Cholesterol: 249 mg/dL      Reviewed and updated as needed this visit by Provider    Allergies  Meds  Problems                 Review of  "Systems  Constitutional, HEENT, cardiovascular, pulmonary, gi and gu systems are negative, except as otherwise noted.     Objective    Exam  /84   Pulse 86   Temp 97  F (36.1  C) (Tympanic)   Resp 16   Ht 1.638 m (5' 4.5\")   Wt 83.8 kg (184 lb 11.2 oz)   LMP 11/25/2015 (Approximate)   SpO2 96%   BMI 31.21 kg/m     Estimated body mass index is 31.21 kg/m  as calculated from the following:    Height as of this encounter: 1.638 m (5' 4.5\").    Weight as of this encounter: 83.8 kg (184 lb 11.2 oz).    Physical Exam  GENERAL: alert and no distress  EYES: Eyes grossly normal to inspection, PERRL and conjunctivae and sclerae normal  HENT: ear canals and TM's normal, nose and mouth without ulcers or lesions  NECK: no adenopathy, no asymmetry, masses, or scars  RESP: lungs clear to auscultation - no rales, rhonchi or wheezes  CV: regular rate and rhythm, normal S1 S2, no S3 or S4, no murmur, click or rub, no peripheral edema   MS: no gross musculoskeletal defects noted, no edema  SKIN: no suspicious lesions or rashes  NEURO: Normal strength and tone, mentation intact and speech normal  PSYCH: mentation appears normal, affect normal/bright        Signed Electronically by: YAKOV Briceno CNP    "

## 2024-10-24 ENCOUNTER — PATIENT OUTREACH (OUTPATIENT)
Dept: CARE COORDINATION | Facility: CLINIC | Age: 59
End: 2024-10-24
Payer: COMMERCIAL

## 2024-10-25 ENCOUNTER — TELEPHONE (OUTPATIENT)
Dept: FAMILY MEDICINE | Facility: CLINIC | Age: 59
End: 2024-10-25
Payer: COMMERCIAL

## 2024-10-25 NOTE — TELEPHONE ENCOUNTER
Forms/Letter Request    Type of form/letter: Proof of annual physical form        Do we have the form/letter: Yes: completed and e-mailed to Bobby Wolfe @ [email or web address]    KISHA Bryant

## 2024-10-31 ENCOUNTER — LAB (OUTPATIENT)
Dept: LAB | Facility: CLINIC | Age: 59
End: 2024-10-31
Payer: COMMERCIAL

## 2024-10-31 DIAGNOSIS — E66.01 CLASS 2 SEVERE OBESITY DUE TO EXCESS CALORIES WITH SERIOUS COMORBIDITY AND BODY MASS INDEX (BMI) OF 35.0 TO 35.9 IN ADULT (H): ICD-10-CM

## 2024-10-31 DIAGNOSIS — E66.812 CLASS 2 SEVERE OBESITY DUE TO EXCESS CALORIES WITH SERIOUS COMORBIDITY AND BODY MASS INDEX (BMI) OF 35.0 TO 35.9 IN ADULT (H): ICD-10-CM

## 2024-10-31 DIAGNOSIS — Z00.00 ANNUAL PHYSICAL EXAM: ICD-10-CM

## 2024-10-31 LAB
CHOLEST SERPL-MCNC: 226 MG/DL
FASTING STATUS PATIENT QL REPORTED: YES
HDLC SERPL-MCNC: 104 MG/DL
LDLC SERPL CALC-MCNC: 113 MG/DL
NONHDLC SERPL-MCNC: 122 MG/DL
TRIGL SERPL-MCNC: 46 MG/DL

## 2024-10-31 PROCEDURE — 36415 COLL VENOUS BLD VENIPUNCTURE: CPT

## 2024-10-31 PROCEDURE — 80061 LIPID PANEL: CPT

## 2024-11-17 ENCOUNTER — MYC MEDICAL ADVICE (OUTPATIENT)
Dept: FAMILY MEDICINE | Facility: CLINIC | Age: 59
End: 2024-11-17
Payer: COMMERCIAL

## 2024-11-18 NOTE — TELEPHONE ENCOUNTER
Responded to pt via [employer]. Unable to locate document and no way to track email sent.    Keren Oliveira on 11/18/2024 at 7:20 AM

## 2025-02-17 ENCOUNTER — MYC REFILL (OUTPATIENT)
Dept: FAMILY MEDICINE | Facility: CLINIC | Age: 60
End: 2025-02-17
Payer: COMMERCIAL

## 2025-02-17 ENCOUNTER — MYC MEDICAL ADVICE (OUTPATIENT)
Dept: FAMILY MEDICINE | Facility: CLINIC | Age: 60
End: 2025-02-17
Payer: COMMERCIAL

## 2025-02-17 DIAGNOSIS — E66.812 CLASS 2 SEVERE OBESITY DUE TO EXCESS CALORIES WITH SERIOUS COMORBIDITY AND BODY MASS INDEX (BMI) OF 35.0 TO 35.9 IN ADULT (H): ICD-10-CM

## 2025-02-17 DIAGNOSIS — E66.01 CLASS 2 SEVERE OBESITY DUE TO EXCESS CALORIES WITH SERIOUS COMORBIDITY AND BODY MASS INDEX (BMI) OF 35.0 TO 35.9 IN ADULT (H): ICD-10-CM

## 2025-02-17 RX ORDER — PHENTERMINE HYDROCHLORIDE 15 MG/1
15 CAPSULE ORAL EVERY MORNING
Qty: 30 CAPSULE | Refills: 0 | Status: SHIPPED | OUTPATIENT
Start: 2025-02-17

## 2025-02-17 RX ORDER — TOPIRAMATE 25 MG/1
25 TABLET, FILM COATED ORAL 2 TIMES DAILY
Qty: 60 TABLET | Refills: 0 | Status: SHIPPED | OUTPATIENT
Start: 2025-02-17

## 2025-04-01 DIAGNOSIS — E66.812 CLASS 2 SEVERE OBESITY DUE TO EXCESS CALORIES WITH SERIOUS COMORBIDITY AND BODY MASS INDEX (BMI) OF 35.0 TO 35.9 IN ADULT (H): ICD-10-CM

## 2025-04-01 DIAGNOSIS — E66.01 CLASS 2 SEVERE OBESITY DUE TO EXCESS CALORIES WITH SERIOUS COMORBIDITY AND BODY MASS INDEX (BMI) OF 35.0 TO 35.9 IN ADULT (H): ICD-10-CM

## 2025-04-02 RX ORDER — PHENTERMINE HYDROCHLORIDE 15 MG/1
15 CAPSULE ORAL EVERY MORNING
Qty: 30 CAPSULE | Refills: 0 | Status: SHIPPED | OUTPATIENT
Start: 2025-04-02

## 2025-07-19 ENCOUNTER — HEALTH MAINTENANCE LETTER (OUTPATIENT)
Age: 60
End: 2025-07-19